# Patient Record
Sex: FEMALE | Race: WHITE | Employment: OTHER | URBAN - METROPOLITAN AREA
[De-identification: names, ages, dates, MRNs, and addresses within clinical notes are randomized per-mention and may not be internally consistent; named-entity substitution may affect disease eponyms.]

---

## 2018-10-05 PROBLEM — I27.20 PULMONARY HYPERTENSION (HCC): Status: ACTIVE | Noted: 2018-10-05

## 2018-10-05 PROBLEM — I71.40 ABDOMINAL AORTIC ANEURYSM (AAA) WITHOUT RUPTURE (HCC): Status: ACTIVE | Noted: 2018-10-05

## 2018-10-05 PROBLEM — J44.9 CHRONIC OBSTRUCTIVE PULMONARY DISEASE, UNSPECIFIED COPD TYPE (HCC): Status: ACTIVE | Noted: 2018-10-05

## 2018-10-05 PROBLEM — R60.0 LOCALIZED EDEMA: Status: ACTIVE | Noted: 2018-10-05

## 2018-10-05 PROBLEM — I71.40 ABDOMINAL AORTIC ANEURYSM (AAA) WITHOUT RUPTURE: Status: ACTIVE | Noted: 2018-10-05

## 2018-10-05 PROBLEM — R06.09 DOE (DYSPNEA ON EXERTION): Status: ACTIVE | Noted: 2018-10-05

## 2019-01-18 PROBLEM — R06.09 DOE (DYSPNEA ON EXERTION): Status: RESOLVED | Noted: 2018-10-05 | Resolved: 2019-01-18

## 2019-04-18 PROBLEM — I50.32 CHRONIC DIASTOLIC CONGESTIVE HEART FAILURE (HCC): Status: ACTIVE | Noted: 2019-04-18

## 2019-08-07 PROBLEM — R13.10 DYSPHAGIA, UNSPECIFIED TYPE: Status: ACTIVE | Noted: 2019-08-07

## 2020-03-13 PROBLEM — I50.32 CHRONIC DIASTOLIC (CONGESTIVE) HEART FAILURE (HCC): Status: ACTIVE | Noted: 2019-04-18

## 2020-03-13 PROBLEM — R13.10 DYSPHAGIA, UNSPECIFIED TYPE: Status: RESOLVED | Noted: 2019-08-07 | Resolved: 2020-03-13

## 2021-03-17 PROBLEM — Z12.11 COLON CANCER SCREENING: Status: ACTIVE | Noted: 2021-03-17

## 2021-03-17 PROBLEM — Z00.00 HEALTH CARE MAINTENANCE: Status: ACTIVE | Noted: 2021-03-17

## 2021-03-17 PROBLEM — Z12.31 VISIT FOR SCREENING MAMMOGRAM: Status: ACTIVE | Noted: 2021-03-17

## 2021-03-17 PROBLEM — Z01.419 WELL WOMAN EXAM WITH ROUTINE GYNECOLOGICAL EXAM: Status: ACTIVE | Noted: 2021-03-17

## 2021-03-17 PROBLEM — Z78.0 POST-MENOPAUSAL: Status: ACTIVE | Noted: 2021-03-17

## 2021-04-14 PROBLEM — R73.01 IMPAIRED FASTING GLUCOSE: Status: ACTIVE | Noted: 2021-04-14

## 2021-04-14 PROBLEM — Z00.00 MEDICARE ANNUAL WELLNESS VISIT, SUBSEQUENT: Status: ACTIVE | Noted: 2021-03-17

## 2021-04-14 PROBLEM — F34.1 DYSTHYMIA: Status: ACTIVE | Noted: 2021-04-14

## 2021-08-20 PROBLEM — M51.36 ANNULAR TEAR OF LUMBAR DISC: Status: ACTIVE | Noted: 2021-08-20

## 2021-08-20 PROBLEM — M47.816 LUMBAR SPONDYLOSIS: Status: ACTIVE | Noted: 2021-08-20

## 2021-08-20 PROBLEM — M51.369 ANNULAR TEAR OF LUMBAR DISC: Status: ACTIVE | Noted: 2021-08-20

## 2021-08-23 PROBLEM — B37.0 ORAL CANDIDIASIS: Status: ACTIVE | Noted: 2021-08-23

## 2021-11-30 PROBLEM — R51.9 SINUS HEADACHE: Status: ACTIVE | Noted: 2021-11-30

## 2021-11-30 PROBLEM — U07.1 COVID-19 VIRUS INFECTION: Status: ACTIVE | Noted: 2021-11-30

## 2021-12-22 PROBLEM — B37.0 ORAL CANDIDIASIS: Status: RESOLVED | Noted: 2021-08-23 | Resolved: 2021-12-22

## 2021-12-22 PROBLEM — U07.1 COVID-19 VIRUS INFECTION: Status: RESOLVED | Noted: 2021-11-30 | Resolved: 2021-12-22

## 2021-12-22 PROBLEM — J01.00 ACUTE MAXILLARY SINUSITIS, RECURRENCE NOT SPECIFIED: Status: ACTIVE | Noted: 2021-12-22

## 2022-01-18 PROBLEM — R09.02 HYPOXIA: Status: ACTIVE | Noted: 2022-01-18

## 2022-01-18 PROBLEM — R06.02 SOB (SHORTNESS OF BREATH): Status: ACTIVE | Noted: 2018-10-05

## 2022-01-18 PROBLEM — R03.0 ELEVATED BLOOD PRESSURE READING WITHOUT DIAGNOSIS OF HYPERTENSION: Status: ACTIVE | Noted: 2022-01-18

## 2022-03-17 PROBLEM — I83.018 VENOUS STASIS ULCER OF OTHER PART OF RIGHT LOWER LEG LIMITED TO BREAKDOWN OF SKIN WITH VARICOSE VEINS (HCC): Status: ACTIVE | Noted: 2022-03-17

## 2022-03-17 PROBLEM — M25.551 RIGHT HIP PAIN: Status: ACTIVE | Noted: 2022-03-17

## 2022-03-17 PROBLEM — L97.811 VENOUS STASIS ULCER OF OTHER PART OF RIGHT LOWER LEG LIMITED TO BREAKDOWN OF SKIN WITH VARICOSE VEINS (HCC): Status: ACTIVE | Noted: 2022-03-17

## 2023-10-04 ENCOUNTER — APPOINTMENT (OUTPATIENT)
Dept: GENERAL RADIOLOGY | Age: 83
End: 2023-10-04
Attending: EMERGENCY MEDICINE
Payer: MEDICARE

## 2023-10-04 ENCOUNTER — APPOINTMENT (OUTPATIENT)
Dept: CT IMAGING | Age: 83
End: 2023-10-04
Attending: EMERGENCY MEDICINE
Payer: MEDICARE

## 2023-10-04 ENCOUNTER — APPOINTMENT (OUTPATIENT)
Dept: GENERAL RADIOLOGY | Age: 83
End: 2023-10-04
Payer: MEDICARE

## 2023-10-04 ENCOUNTER — HOSPITAL ENCOUNTER (OUTPATIENT)
Facility: HOSPITAL | Age: 83
Setting detail: OBSERVATION
LOS: 1 days | Discharge: HOME | End: 2023-10-05
Attending: EMERGENCY MEDICINE | Admitting: STUDENT IN AN ORGANIZED HEALTH CARE EDUCATION/TRAINING PROGRAM
Payer: MEDICARE

## 2023-10-04 DIAGNOSIS — J18.9 PNEUMONIA OF BOTH LOWER LOBES DUE TO INFECTIOUS ORGANISM: ICD-10-CM

## 2023-10-04 DIAGNOSIS — M25.511 ACUTE PAIN OF RIGHT SHOULDER: ICD-10-CM

## 2023-10-04 DIAGNOSIS — R06.00 DYSPNEA, UNSPECIFIED TYPE: Primary | ICD-10-CM

## 2023-10-04 LAB
ALBUMIN SERPL-MCNC: 3.6 G/DL (ref 3.4–5)
ALBUMIN/GLOB SERPL: 1.1 {RATIO} (ref 1–2)
ALP LIVER SERPL-CCNC: 96 U/L
ALT SERPL-CCNC: 21 U/L
ANION GAP SERPL CALC-SCNC: 3 MMOL/L (ref 0–18)
APTT PPP: 35.9 SECONDS (ref 23.3–35.6)
AST SERPL-CCNC: 13 U/L (ref 15–37)
ATRIAL RATE: 74 BPM
BASOPHILS # BLD AUTO: 0.06 X10(3) UL (ref 0–0.2)
BASOPHILS NFR BLD AUTO: 0.7 %
BILIRUB SERPL-MCNC: 0.6 MG/DL (ref 0.1–2)
BUN BLD-MCNC: 17 MG/DL (ref 7–18)
CALCIUM BLD-MCNC: 8.4 MG/DL (ref 8.5–10.1)
CHLORIDE SERPL-SCNC: 108 MMOL/L (ref 98–112)
CO2 SERPL-SCNC: 27 MMOL/L (ref 21–32)
CREAT BLD-MCNC: 0.82 MG/DL
D DIMER PPP FEU-MCNC: 1.59 UG/ML FEU (ref ?–0.81)
EGFRCR SERPLBLD CKD-EPI 2021: 72 ML/MIN/1.73M2 (ref 60–?)
EOSINOPHIL # BLD AUTO: 0.22 X10(3) UL (ref 0–0.7)
EOSINOPHIL NFR BLD AUTO: 2.7 %
ERYTHROCYTE [DISTWIDTH] IN BLOOD BY AUTOMATED COUNT: 13.9 %
GLOBULIN PLAS-MCNC: 3.2 G/DL (ref 2.8–4.4)
GLUCOSE BLD-MCNC: 100 MG/DL (ref 70–99)
HCT VFR BLD AUTO: 36 %
HGB BLD-MCNC: 11.2 G/DL
IMM GRANULOCYTES # BLD AUTO: 0.02 X10(3) UL (ref 0–1)
IMM GRANULOCYTES NFR BLD: 0.2 %
INR BLD: 1.04 (ref 0.85–1.16)
LYMPHOCYTES # BLD AUTO: 1.63 X10(3) UL (ref 1–4)
LYMPHOCYTES NFR BLD AUTO: 19.7 %
MCH RBC QN AUTO: 27 PG (ref 26–34)
MCHC RBC AUTO-ENTMCNC: 31.1 G/DL (ref 31–37)
MCV RBC AUTO: 86.7 FL
MONOCYTES # BLD AUTO: 0.73 X10(3) UL (ref 0.1–1)
MONOCYTES NFR BLD AUTO: 8.8 %
NEUTROPHILS # BLD AUTO: 5.61 X10 (3) UL (ref 1.5–7.7)
NEUTROPHILS # BLD AUTO: 5.61 X10(3) UL (ref 1.5–7.7)
NEUTROPHILS NFR BLD AUTO: 67.9 %
NT-PROBNP SERPL-MCNC: 388 PG/ML (ref ?–450)
OSMOLALITY SERPL CALC.SUM OF ELEC: 288 MOSM/KG (ref 275–295)
P AXIS: 32 DEGREES
P-R INTERVAL: 144 MS
PLATELET # BLD AUTO: 192 10(3)UL (ref 150–450)
POTASSIUM SERPL-SCNC: 4 MMOL/L (ref 3.5–5.1)
PROCALCITONIN SERPL-MCNC: <0.05 NG/ML (ref ?–0.16)
PROT SERPL-MCNC: 6.8 G/DL (ref 6.4–8.2)
PROTHROMBIN TIME: 13.6 SECONDS (ref 11.6–14.8)
Q-T INTERVAL: 398 MS
QRS DURATION: 76 MS
QTC CALCULATION (BEZET): 441 MS
R AXIS: 48 DEGREES
RBC # BLD AUTO: 4.15 X10(6)UL
SARS-COV-2 RNA RESP QL NAA+PROBE: NOT DETECTED
SODIUM SERPL-SCNC: 138 MMOL/L (ref 136–145)
T AXIS: 41 DEGREES
TROPONIN I HIGH SENSITIVITY: 5 NG/L
VENTRICULAR RATE: 74 BPM
WBC # BLD AUTO: 8.3 X10(3) UL (ref 4–11)

## 2023-10-04 PROCEDURE — 99223 1ST HOSP IP/OBS HIGH 75: CPT | Performed by: STUDENT IN AN ORGANIZED HEALTH CARE EDUCATION/TRAINING PROGRAM

## 2023-10-04 PROCEDURE — 71260 CT THORAX DX C+: CPT | Performed by: EMERGENCY MEDICINE

## 2023-10-04 PROCEDURE — 71045 X-RAY EXAM CHEST 1 VIEW: CPT | Performed by: EMERGENCY MEDICINE

## 2023-10-04 PROCEDURE — 73030 X-RAY EXAM OF SHOULDER: CPT | Performed by: EMERGENCY MEDICINE

## 2023-10-04 PROCEDURE — 73030 X-RAY EXAM OF SHOULDER: CPT

## 2023-10-04 RX ORDER — ENEMA 19; 7 G/133ML; G/133ML
1 ENEMA RECTAL ONCE AS NEEDED
Status: DISCONTINUED | OUTPATIENT
Start: 2023-10-04 | End: 2023-10-05

## 2023-10-04 RX ORDER — AZITHROMYCIN 250 MG/1
500 TABLET, FILM COATED ORAL
Status: DISCONTINUED | OUTPATIENT
Start: 2023-10-05 | End: 2023-10-05

## 2023-10-04 RX ORDER — MAGNESIUM OXIDE 400 MG (241.3 MG MAGNESIUM) TABLET
3 TABLET NIGHTLY PRN
Status: DISCONTINUED | OUTPATIENT
Start: 2023-10-04 | End: 2023-10-05

## 2023-10-04 RX ORDER — ONDANSETRON 2 MG/ML
4 INJECTION INTRAMUSCULAR; INTRAVENOUS EVERY 6 HOURS PRN
Status: DISCONTINUED | OUTPATIENT
Start: 2023-10-04 | End: 2023-10-05

## 2023-10-04 RX ORDER — ACETAMINOPHEN 500 MG
500 TABLET ORAL EVERY 4 HOURS PRN
Status: DISCONTINUED | OUTPATIENT
Start: 2023-10-04 | End: 2023-10-05

## 2023-10-04 RX ORDER — METOCLOPRAMIDE HYDROCHLORIDE 5 MG/ML
10 INJECTION INTRAMUSCULAR; INTRAVENOUS EVERY 8 HOURS PRN
Status: DISCONTINUED | OUTPATIENT
Start: 2023-10-04 | End: 2023-10-05

## 2023-10-04 RX ORDER — HYDROCODONE BITARTRATE AND ACETAMINOPHEN 5; 325 MG/1; MG/1
1 TABLET ORAL EVERY 6 HOURS PRN
Status: DISCONTINUED | OUTPATIENT
Start: 2023-10-04 | End: 2023-10-05

## 2023-10-04 RX ORDER — HYDROCODONE BITARTRATE AND ACETAMINOPHEN 5; 325 MG/1; MG/1
1 TABLET ORAL ONCE
Status: COMPLETED | OUTPATIENT
Start: 2023-10-04 | End: 2023-10-04

## 2023-10-04 RX ORDER — ENOXAPARIN SODIUM 100 MG/ML
40 INJECTION SUBCUTANEOUS DAILY
Status: DISCONTINUED | OUTPATIENT
Start: 2023-10-05 | End: 2023-10-05

## 2023-10-04 RX ORDER — ONDANSETRON 2 MG/ML
4 INJECTION INTRAMUSCULAR; INTRAVENOUS EVERY 4 HOURS PRN
Status: ACTIVE | OUTPATIENT
Start: 2023-10-04 | End: 2023-10-04

## 2023-10-04 RX ORDER — BISACODYL 10 MG
10 SUPPOSITORY, RECTAL RECTAL
Status: DISCONTINUED | OUTPATIENT
Start: 2023-10-04 | End: 2023-10-05

## 2023-10-04 RX ORDER — SENNOSIDES 8.6 MG
17.2 TABLET ORAL NIGHTLY PRN
Status: DISCONTINUED | OUTPATIENT
Start: 2023-10-04 | End: 2023-10-05

## 2023-10-04 RX ORDER — BENZONATATE 100 MG/1
200 CAPSULE ORAL 3 TIMES DAILY PRN
Status: DISCONTINUED | OUTPATIENT
Start: 2023-10-04 | End: 2023-10-05

## 2023-10-04 RX ORDER — FLUOXETINE HYDROCHLORIDE 20 MG/1
40 CAPSULE ORAL DAILY
Status: DISCONTINUED | OUTPATIENT
Start: 2023-10-04 | End: 2023-10-05

## 2023-10-04 RX ORDER — GUAIFENESIN 600 MG/1
600 TABLET, EXTENDED RELEASE ORAL 2 TIMES DAILY PRN
Status: DISCONTINUED | OUTPATIENT
Start: 2023-10-04 | End: 2023-10-05

## 2023-10-04 RX ORDER — FLUTICASONE FUROATE AND VILANTEROL 100; 25 UG/1; UG/1
1 POWDER RESPIRATORY (INHALATION) DAILY
Status: DISCONTINUED | OUTPATIENT
Start: 2023-10-05 | End: 2023-10-05

## 2023-10-04 RX ORDER — POLYETHYLENE GLYCOL 3350 17 G/17G
17 POWDER, FOR SOLUTION ORAL DAILY PRN
Status: DISCONTINUED | OUTPATIENT
Start: 2023-10-04 | End: 2023-10-05

## 2023-10-04 RX ORDER — MONTELUKAST SODIUM 10 MG/1
10 TABLET ORAL NIGHTLY
Status: DISCONTINUED | OUTPATIENT
Start: 2023-10-04 | End: 2023-10-05

## 2023-10-04 RX ORDER — ECHINACEA PURPUREA EXTRACT 125 MG
1 TABLET ORAL
Status: DISCONTINUED | OUTPATIENT
Start: 2023-10-04 | End: 2023-10-05

## 2023-10-04 NOTE — ED INITIAL ASSESSMENT (HPI)
States she fell in her bathroom yesterday and injured her right shoulder. Also states has felt SOB for the past month. No worse today. Pt on 6L home O2.

## 2023-10-05 VITALS
WEIGHT: 125.25 LBS | SYSTOLIC BLOOD PRESSURE: 139 MMHG | DIASTOLIC BLOOD PRESSURE: 61 MMHG | HEIGHT: 64.5 IN | OXYGEN SATURATION: 93 % | TEMPERATURE: 97 F | BODY MASS INDEX: 21.12 KG/M2 | RESPIRATION RATE: 17 BRPM | HEART RATE: 74 BPM

## 2023-10-05 LAB
ALBUMIN SERPL-MCNC: 3.2 G/DL (ref 3.4–5)
ALBUMIN/GLOB SERPL: 0.9 {RATIO} (ref 1–2)
ALP LIVER SERPL-CCNC: 90 U/L
ALT SERPL-CCNC: 20 U/L
ANION GAP SERPL CALC-SCNC: 5 MMOL/L (ref 0–18)
AST SERPL-CCNC: 11 U/L (ref 15–37)
BASOPHILS # BLD AUTO: 0.06 X10(3) UL (ref 0–0.2)
BASOPHILS NFR BLD AUTO: 0.9 %
BILIRUB SERPL-MCNC: 0.6 MG/DL (ref 0.1–2)
BUN BLD-MCNC: 12 MG/DL (ref 7–18)
CALCIUM BLD-MCNC: 8.5 MG/DL (ref 8.5–10.1)
CHLORIDE SERPL-SCNC: 110 MMOL/L (ref 98–112)
CO2 SERPL-SCNC: 25 MMOL/L (ref 21–32)
CREAT BLD-MCNC: 0.63 MG/DL
EGFRCR SERPLBLD CKD-EPI 2021: 89 ML/MIN/1.73M2 (ref 60–?)
EOSINOPHIL # BLD AUTO: 0.25 X10(3) UL (ref 0–0.7)
EOSINOPHIL NFR BLD AUTO: 3.7 %
ERYTHROCYTE [DISTWIDTH] IN BLOOD BY AUTOMATED COUNT: 13.6 %
GLOBULIN PLAS-MCNC: 3.4 G/DL (ref 2.8–4.4)
GLUCOSE BLD-MCNC: 97 MG/DL (ref 70–99)
HCT VFR BLD AUTO: 35.3 %
HGB BLD-MCNC: 11.4 G/DL
IMM GRANULOCYTES # BLD AUTO: 0.02 X10(3) UL (ref 0–1)
IMM GRANULOCYTES NFR BLD: 0.3 %
L PNEUMO AG UR QL: NEGATIVE
LYMPHOCYTES # BLD AUTO: 0.99 X10(3) UL (ref 1–4)
LYMPHOCYTES NFR BLD AUTO: 14.5 %
MCH RBC QN AUTO: 27 PG (ref 26–34)
MCHC RBC AUTO-ENTMCNC: 32.3 G/DL (ref 31–37)
MCV RBC AUTO: 83.6 FL
MONOCYTES # BLD AUTO: 0.65 X10(3) UL (ref 0.1–1)
MONOCYTES NFR BLD AUTO: 9.5 %
NEUTROPHILS # BLD AUTO: 4.85 X10 (3) UL (ref 1.5–7.7)
NEUTROPHILS # BLD AUTO: 4.85 X10(3) UL (ref 1.5–7.7)
NEUTROPHILS NFR BLD AUTO: 71.1 %
OSMOLALITY SERPL CALC.SUM OF ELEC: 290 MOSM/KG (ref 275–295)
PLATELET # BLD AUTO: 187 10(3)UL (ref 150–450)
POTASSIUM SERPL-SCNC: 3.9 MMOL/L (ref 3.5–5.1)
PROT SERPL-MCNC: 6.6 G/DL (ref 6.4–8.2)
RBC # BLD AUTO: 4.22 X10(6)UL
SODIUM SERPL-SCNC: 140 MMOL/L (ref 136–145)
STREP PNEUMO ANTIGEN, URINE: NEGATIVE
WBC # BLD AUTO: 6.8 X10(3) UL (ref 4–11)

## 2023-10-05 PROCEDURE — 99239 HOSP IP/OBS DSCHRG MGMT >30: CPT | Performed by: HOSPITALIST

## 2023-10-05 NOTE — ED QUICK NOTES
Orders for admission, patient is aware of plan and ready to go upstairs. Any questions, please call ED RN Corina Biswas  at extension [phone]. Vaccinated? yes  Type of COVID test sent:rapid  COVID Suspicion level: Low      Titratable drug(s) infusing:  Rate: Zithromax 500mg at 250ml /hr    LOC at time of transport:    Other pertinent information: Pt on home O2 at 6L . States she has felt exertional SOB for a month but worse lately with fatigue.  SaO2 on arrival 78% but immediately karen to 97% with 6L    CIWA score=  NIH score=

## 2023-10-05 NOTE — PLAN OF CARE
NURSING ADMISSION NOTE      Patient admitted via Ambulance  Oriented to room 515. Safety precautions initiated. Bed in low position. Call light in reach. Pt A&OX4. Nvigator and PTA med list completed - MD notified. Received on 6L (Baseline) satikiya WNL. . On tele. NSR. C/o R shoulder pain - See MAR. IV ABX. PIV SL. Continent. BRP. Up standby. HFR. Pt updated on plan of care and verbalized understanding. Call light within reach. All needs met at this time. Safety precautions in place. Will follow.

## 2023-10-05 NOTE — PROGRESS NOTES
NURSING DISCHARGE NOTE    Discharged Home via Wheelchair. Accompanied by Family member and Support staff  Belongings Taken by patient/family. AVS explained. Answered all questions/concerns. VSS. IV removed. Patient taken home with 6L O2 (home O2). No further needs at this time.

## 2023-10-05 NOTE — PLAN OF CARE
Patient is a/o x4. On 6L of O2. NSR on tele. Lovenox. Regular diet. Continent of bowel and bladder. Up SBA. Reports pain of shoulder- see MAR. Safety precautions in place. Call light within reach. No further needs at this time. Problem: Patient/Family Goals  Goal: Patient/Family Long Term Goal  Description: Patient's Long Term Goal: To discharge home with adequate resources    Interventions:  - Comply with plan of care  - See additional Care Plan goals for specific interventions  Outcome: Progressing  Goal: Patient/Family Short Term Goal  Description: Patient's Short Term Goal:   10/4 NOC: sleep, remain free from injury    Interventions:   - Cluster care, dim lights, bed alarm, call light within reach   - See additional Care Plan goals for specific interventions  Outcome: Progressing     Problem: SAFETY ADULT - FALL  Goal: Free from fall injury  Description: INTERVENTIONS:  - Assess pt frequently for physical needs  - Identify cognitive and physical deficits and behaviors that affect risk of falls.   - [place] fall precautions as indicated by assessment.  - Educate pt/family on patient safety including physical limitations  - Instruct pt to call for assistance with activity based on assessment  - Modify environment to reduce risk of injury  - Provide assistive devices as appropriate  - Consider OT/PT consult to assist with strengthening/mobility  - Encourage toileting schedule  Outcome: Progressing     Problem: PAIN - ADULT  Goal: Verbalizes/displays adequate comfort level or patient's stated pain goal  Description: INTERVENTIONS:  - Encourage pt to monitor pain and request assistance  - Assess pain using appropriate pain scale  - Administer analgesics based on type and severity of pain and evaluate response  - Implement non-pharmacological measures as appropriate and evaluate response  - Consider cultural and social influences on pain and pain management  - Manage/alleviate anxiety  - Utilize distraction and/or relaxation techniques  - Monitor for opioid side effects  - Notify MD/LIP if interventions unsuccessful or patient reports new pain  - Anticipate increased pain with activity and pre-medicate as appropriate  Outcome: Progressing

## 2023-10-05 NOTE — PLAN OF CARE
Problem: Patient/Family Goals  Goal: Patient/Family Long Term Goal  Description: Patient's Long Term Goal: To discharge home with adequate resources    Interventions:  - Comply with plan of care  - See additional Care Plan goals for specific interventions  Outcome: Progressing  Goal: Patient/Family Short Term Goal  Description: Patient's Short Term Goal:   10/4 NOC: sleep, remain free from injury    Interventions:   - Cluster care, dim lights, bed alarm, call light within reach   - See additional Care Plan goals for specific interventions  Outcome: Progressing     Problem: SAFETY ADULT - FALL  Goal: Free from fall injury  Description: INTERVENTIONS:  - Assess pt frequently for physical needs  - Identify cognitive and physical deficits and behaviors that affect risk of falls.   - [place] fall precautions as indicated by assessment.  - Educate pt/family on patient safety including physical limitations  - Instruct pt to call for assistance with activity based on assessment  - Modify environment to reduce risk of injury  - Provide assistive devices as appropriate  - Consider OT/PT consult to assist with strengthening/mobility  - Encourage toileting schedule  Outcome: Progressing     Problem: PAIN - ADULT  Goal: Verbalizes/displays adequate comfort level or patient's stated pain goal  Description: INTERVENTIONS:  - Encourage pt to monitor pain and request assistance  - Assess pain using appropriate pain scale  - Administer analgesics based on type and severity of pain and evaluate response  - Implement non-pharmacological measures as appropriate and evaluate response  - Consider cultural and social influences on pain and pain management  - Manage/alleviate anxiety  - Utilize distraction and/or relaxation techniques  - Monitor for opioid side effects  - Notify MD/LIP if interventions unsuccessful or patient reports new pain  - Anticipate increased pain with activity and pre-medicate as appropriate  Outcome: Progressing

## 2024-08-08 ENCOUNTER — APPOINTMENT (OUTPATIENT)
Dept: CT IMAGING | Age: 84
End: 2024-08-08
Attending: PHYSICIAN ASSISTANT
Payer: MEDICARE

## 2024-08-08 ENCOUNTER — HOSPITAL ENCOUNTER (EMERGENCY)
Age: 84
Discharge: HOME | End: 2024-08-08
Attending: EMERGENCY MEDICINE
Payer: MEDICARE

## 2024-08-08 VITALS
RESPIRATION RATE: 16 BRPM | TEMPERATURE: 98 F | DIASTOLIC BLOOD PRESSURE: 68 MMHG | WEIGHT: 123 LBS | OXYGEN SATURATION: 97 % | BODY MASS INDEX: 20.74 KG/M2 | HEART RATE: 69 BPM | HEIGHT: 64.5 IN | SYSTOLIC BLOOD PRESSURE: 156 MMHG

## 2024-08-08 DIAGNOSIS — A08.4 VIRAL ENTERITIS: Primary | ICD-10-CM

## 2024-08-08 DIAGNOSIS — R82.90 MALODOROUS URINE: ICD-10-CM

## 2024-08-08 DIAGNOSIS — K59.00 CONSTIPATION, UNSPECIFIED CONSTIPATION TYPE: ICD-10-CM

## 2024-08-08 LAB
ALBUMIN SERPL-MCNC: 3.8 G/DL (ref 3.4–5)
ALBUMIN/GLOB SERPL: 1.3 {RATIO} (ref 1–2)
ALP LIVER SERPL-CCNC: 97 U/L
ALT SERPL-CCNC: 20 U/L
ANION GAP SERPL CALC-SCNC: 4 MMOL/L (ref 0–18)
AST SERPL-CCNC: 13 U/L (ref 15–37)
BASOPHILS # BLD AUTO: 0.03 X10(3) UL (ref 0–0.2)
BASOPHILS NFR BLD AUTO: 0.5 %
BILIRUB SERPL-MCNC: 0.4 MG/DL (ref 0.1–2)
BILIRUB UR QL STRIP.AUTO: NEGATIVE
BUN BLD-MCNC: 18 MG/DL (ref 9–23)
CALCIUM BLD-MCNC: 9.1 MG/DL (ref 8.5–10.1)
CHLORIDE SERPL-SCNC: 105 MMOL/L (ref 98–112)
CLARITY UR REFRACT.AUTO: CLEAR
CO2 SERPL-SCNC: 30 MMOL/L (ref 21–32)
COLOR UR AUTO: YELLOW
CREAT BLD-MCNC: 0.69 MG/DL
EGFRCR SERPLBLD CKD-EPI 2021: 87 ML/MIN/1.73M2 (ref 60–?)
EOSINOPHIL # BLD AUTO: 0.13 X10(3) UL (ref 0–0.7)
EOSINOPHIL NFR BLD AUTO: 2.1 %
ERYTHROCYTE [DISTWIDTH] IN BLOOD BY AUTOMATED COUNT: 13.6 %
GLOBULIN PLAS-MCNC: 3 G/DL (ref 2.8–4.4)
GLUCOSE BLD-MCNC: 98 MG/DL (ref 70–99)
GLUCOSE UR STRIP.AUTO-MCNC: NEGATIVE MG/DL
HCT VFR BLD AUTO: 35.4 %
HGB BLD-MCNC: 11.1 G/DL
IMM GRANULOCYTES # BLD AUTO: 0.02 X10(3) UL (ref 0–1)
IMM GRANULOCYTES NFR BLD: 0.3 %
KETONES UR STRIP.AUTO-MCNC: NEGATIVE MG/DL
LEUKOCYTE ESTERASE UR QL STRIP.AUTO: NEGATIVE
LYMPHOCYTES # BLD AUTO: 1.19 X10(3) UL (ref 1–4)
LYMPHOCYTES NFR BLD AUTO: 19.3 %
MCH RBC QN AUTO: 27.5 PG (ref 26–34)
MCHC RBC AUTO-ENTMCNC: 31.4 G/DL (ref 31–37)
MCV RBC AUTO: 87.6 FL
MONOCYTES # BLD AUTO: 0.48 X10(3) UL (ref 0.1–1)
MONOCYTES NFR BLD AUTO: 7.8 %
NEUTROPHILS # BLD AUTO: 4.32 X10 (3) UL (ref 1.5–7.7)
NEUTROPHILS # BLD AUTO: 4.32 X10(3) UL (ref 1.5–7.7)
NEUTROPHILS NFR BLD AUTO: 70 %
NITRITE UR QL STRIP.AUTO: POSITIVE
OSMOLALITY SERPL CALC.SUM OF ELEC: 290 MOSM/KG (ref 275–295)
PH UR STRIP.AUTO: 7 [PH] (ref 5–8)
PLATELET # BLD AUTO: 200 10(3)UL (ref 150–450)
POTASSIUM SERPL-SCNC: 3.9 MMOL/L (ref 3.5–5.1)
PROT SERPL-MCNC: 6.8 G/DL (ref 6.4–8.2)
PROT UR STRIP.AUTO-MCNC: NEGATIVE MG/DL
RBC # BLD AUTO: 4.04 X10(6)UL
RBC UR QL AUTO: NEGATIVE
SODIUM SERPL-SCNC: 139 MMOL/L (ref 136–145)
SP GR UR STRIP.AUTO: 1.02 (ref 1–1.03)
UROBILINOGEN UR STRIP.AUTO-MCNC: 0.2 MG/DL
WBC # BLD AUTO: 6.2 X10(3) UL (ref 4–11)

## 2024-08-08 PROCEDURE — 81001 URINALYSIS AUTO W/SCOPE: CPT | Performed by: PHYSICIAN ASSISTANT

## 2024-08-08 PROCEDURE — 85025 COMPLETE CBC W/AUTO DIFF WBC: CPT | Performed by: PHYSICIAN ASSISTANT

## 2024-08-08 PROCEDURE — 74177 CT ABD & PELVIS W/CONTRAST: CPT | Performed by: PHYSICIAN ASSISTANT

## 2024-08-08 PROCEDURE — 99285 EMERGENCY DEPT VISIT HI MDM: CPT

## 2024-08-08 PROCEDURE — 81015 MICROSCOPIC EXAM OF URINE: CPT | Performed by: PHYSICIAN ASSISTANT

## 2024-08-08 PROCEDURE — 96361 HYDRATE IV INFUSION ADD-ON: CPT

## 2024-08-08 PROCEDURE — 99284 EMERGENCY DEPT VISIT MOD MDM: CPT

## 2024-08-08 PROCEDURE — 80053 COMPREHEN METABOLIC PANEL: CPT | Performed by: PHYSICIAN ASSISTANT

## 2024-08-08 PROCEDURE — 96374 THER/PROPH/DIAG INJ IV PUSH: CPT

## 2024-08-08 RX ORDER — NITROFURANTOIN 25; 75 MG/1; MG/1
100 CAPSULE ORAL 2 TIMES DAILY
Qty: 10 CAPSULE | Refills: 0 | Status: SHIPPED | OUTPATIENT
Start: 2024-08-08 | End: 2024-08-13

## 2024-08-08 RX ORDER — MORPHINE SULFATE 4 MG/ML
2 INJECTION, SOLUTION INTRAMUSCULAR; INTRAVENOUS ONCE
Status: COMPLETED | OUTPATIENT
Start: 2024-08-08 | End: 2024-08-08

## 2024-08-08 NOTE — DISCHARGE INSTRUCTIONS
Antibiotic as written.  Continue MiraLAX and Dulcolax.  Add magnesium citrate if needed.  If you develop any severe pain, fever, blood or mucus in stool immediately return to the ER.  If urine culture dictates a change in therapy, you will be contacted

## 2024-08-08 NOTE — ED PROVIDER NOTES
Patient with several medical issues including pulmonary hypertension, coronary artery disease, hyperlipidemia, COPD on chronic oxygen and 2 abdominal hernia surgeries..  Patient has had several surgeries in the past.  Patient suspects that she has a hernia in the right groin as she has had pain in that location for some time.  For the last week or so, patient reports increased pain at the hernia site.  Patient is passing gas and having small stools.  For the last month, she describes mostly small favian  No urinary symptoms or flank pain.  No upper abdominal pain   Patient not on any fiber supplement or stool softeners    on examination, this alert elderly woman who appears uncomfortable  Eyes sclera white abdomen with large well-healed scar, nondistended mildly diffusely tender across lower quadrants bilaterally.  There is some fullness in the right groin.   Calves are nonswollen and symmetric    Patient with pain in the right groin suggests hernia.  Abdominal examination is nonsurgical but she is quite tender.  Symptoms are not typical of renal colic although this can be considered with groin pain.  Patient treated with IV fluid and offered pain and nausea medicine    CBC shows normal white count, hematocrit, platelets   metabolic panel unremarkable  Urinalysis shows positive nitrites but only 1-5 WBCs in a specimen contaminated with epithelial cells  Urine culture sent    CT abdomen pelvis  I personally reviewed the actual radiographs themselves and my individual interpretation shows no hernia.  There is aortic aneurysm requiring follow-up  radiologist's formal interpretation which I have reviewed  CONCLUSION:  Suspect enteritis.  No sign of bowel obstruction.  No ascites or free air.  Sizable abdominal aortic aneurysm measuring maximum diameter 4.7 cm transverse dimension.  Details above.  Appropriate follow-up and management for this aneurysm is   advised.  No sign of dissection or acute rupture of the aorta.        Test were reviewed with the patient.  I would recommend follow-up with her primary care doctor and/or GI specialist, rest, light diet, Tylenol or Motrin for pain.  I also discussed starting fiber supplement and stool softener with the patient and her friend to maintain regularity with bulky soft stools    I provided a substantive portion of care for this patient. I personally performed the medical decision making for this encounter.

## 2024-08-08 NOTE — ED PROVIDER NOTES
Patient Seen in: [facility] [place]      History     Chief Complaint   Patient presents with    Abdomen/Flank Pain    Hernia     Stated Complaint: abd pain, hernia ain for one week worse    Subjective:   HPI    83-year-old female.  Medical history of COPD on chronic oxygen.   coronary artery disease, hyperlipidemia, pulmonary hypertension.  Patient also explains that she is under gone 5 previous abdominal surgeries including 3 hernia repairs.  She has a known hernia to the right inguinal space.  For the past month, her stools have been abnormal.  Described as small favian.  These past 7 to 10 days she has had increased pain to her inguinal site with possible increased protrusion of her known hernia.  No fever or chills.  No urinary complaints.  No vomiting.  Continues to pass gas and have small stools.    Objective:   Past Medical History:    ANXIETY    Cervical radiculitis    COPD (chronic obstructive pulmonary disease) (HCC)    Coronary atherosclerosis of unspecified type of vessel, native or graft    H/O degenerative disc disease    HYPERLIPIDEMIA    Irritable bowel syndrome    Lumbar radiculitis    Lumbar spondylosis    OSTEOARTHRITIS    OSTEOPOROSIS    Other and unspecified personal history of malignant neoplasm    Pulmonary hypertension (HCC)    Venous insufficiency              Past Surgical History:   Procedure Laterality Date    Appendectomy      Colonoscopy      Epidurography, radiological s & i  3/12/2012    Procedure: TRANSFORAMINAL EPIDURAL - LUMBAR;  Surgeon: oJe Gonsales MD;  Location: [facility]    Hernia surgery      Hysterectomy      Injection, anesthetic/steroid, transforaminal epidural; lumbar/sacral, single level  2/3/2012    Performed by MALORIE GALICIA at [facility]    Injection, anesthetic/steroid, transforaminal epidural; lumbar/sacral, single level  2/21/2012    Procedure: TRANSFORAMINAL EPIDURAL - LUMBAR;  Surgeon:  Alex Hartman MD;  Location: [facility]    Injection, w/wo contrast, dx/therapeutic substance, epidural/subarachnoid; cervical/thoracic  3/12/2012    Procedure: TRANSFORAMINAL EPIDURAL - LUMBAR;  Surgeon: Joe Gonsales MD;  Location: [facility]    Injection, w/wo contrast, dx/therapeutic substance, epidural/subarachnoid; cervical/thoracic N/A 3/22/2016    Procedure: CERVICAL EPIDURAL;  Surgeon: Joe Gonsales MD;  Location: [facility]    Ir varicose vein endovenous laser ablation(cpt=36478)  2014    left SSV    Ir varicose vein endovenous laser ablation(cpt=36478) Right 09/2020    Laminectomy      laminectomy    M-sedaj by  phys perfrmg svc 5+ yr  2/3/2012    Performed by MALORIE GALICIA at [facility]    M-sedaj by  phys perfrmg svc 5+ yr  2/21/2012    Procedure: TRANSFORAMINAL EPIDURAL - LUMBAR;  Surgeon: Alex Hartman MD;  Location: [facility]    M-sedaj by  phys perfrmg svc 5+ yr  3/12/2012    Procedure: TRANSFORAMINAL EPIDURAL - LUMBAR;  Surgeon: Joe Gonsales MD;  Location: [facility]    M-sedaj by  phys perfrmg svc 5+ yr N/A 3/22/2016    Procedure: CERVICAL EPIDURAL;  Surgeon: Joe Gonsales MD;  Location: [facility]    Other surgical history      anterior repair of pelvic floor    Patient documented not to have experienced any of the following events N/A 3/22/2016    Procedure: CERVICAL EPIDURAL;  Surgeon: Joe Gonsales MD;  Location: [facility]    Patient withough preoperative order for iv antibiotic surgical site infection prophylaxis. N/A 3/22/2016    Procedure: CERVICAL EPIDURAL;  Surgeon: Joe Gonsales MD;  Location: [facility]    Repair ing hernia,5+y/o,reducibl      Repair shoulder capsule,anterior      Sclerotherapy Bilateral 2014    Special service or report      removal of colon mass    Spinal  fusion      cervical fusion    Tonsillectomy      Total abdom hysterectomy      Vein ligation and stripping Bilateral     about                 Social History     Socioeconomic History    Marital status:    Tobacco Use    Smoking status: Former     Current packs/day: 0.00     Types: Cigarettes     Quit date: 3/1/2005     Years since quittin.4    Smokeless tobacco: Never   Vaping Use    Vaping status: Never Used   Substance and Sexual Activity    Alcohol use: No    Drug use: No     Comment: pt did not respond     Social Determinants of Health     Food Insecurity: No Food Insecurity (10/4/2023)    Food Insecurity     Food Insecurity: Never true   Transportation Needs: No Transportation Needs (10/4/2023)    Transportation Needs     Lack of Transportation: No   Housing Stability: Low Risk  (10/4/2023)    Housing Stability     Housing Instability: No              Review of Systems    Positive for stated Chief Complaint: Abdomen/Flank Pain and Hernia    Other systems are as noted in HPI.  Constitutional and vital signs reviewed.      All other systems reviewed and negative except as noted above.    Physical Exam     ED Triage Vitals [24 1247]   /70   Pulse 70   Resp 18   Temp 97.9 °F (36.6 °C)   Temp src Temporal   SpO2 90 %   O2 Device Nasal cannula       Current Vitals:   Vital Signs  BP: 156/68  Pulse: 69  Resp: 16  Temp: 97.9 °F (36.6 °C)  Temp src: Temporal    Oxygen Therapy  SpO2: 97 %  O2 Device: Nasal cannula  O2 Flow Rate (L/min): 6 L/min            Physical Exam    Gen: Well appearing, well groomed, alert and aware x 3  Neck: Supple, full range of motion, no thyromegaly or lymphadenopathy.  Abdominal: Right inguinal palpable fullness.  Minimal pain to palpation.  Soft.  Generalized pain to patient through the lower quadrant  Back: Full active range of motion.  No CVA tenderness bilaterally.  Lung: No distress, RR, no retraction, breath sounds are clear bilaterally  Cardio: Regular  rate and rhythm, normal S1-S2, no murmur appreciable    ED Course     Labs Reviewed   CBC WITH DIFFERENTIAL WITH PLATELET - Abnormal; Notable for the following components:       Result Value    HGB 11.1 (*)     All other components within normal limits   COMP METABOLIC PANEL (14) - Abnormal; Notable for the following components:    AST 13 (*)     All other components within normal limits   URINALYSIS, ROUTINE - Abnormal; Notable for the following components:    Nitrite Urine Positive (*)     All other components within normal limits   UA MICROSCOPIC ONLY, URINE - Abnormal; Notable for the following components:    Bacteria Urine 3+ (*)     Squamous Epi. Cells Few (*)     All other components within normal limits           CT ABDOMEN+PELVIS(CONTRAST ONLY)(CPT=74177)    Result Date: 8/8/2024  PROCEDURE:  CT ABDOMEN+PELVIS (CONTRAST ONLY) (CPT=74177)  COMPARISON:  None.  INDICATIONS:  abd pain, hernia ain for one week worse  TECHNIQUE:  CT scanning was performed from the dome of the diaphragm to the pubic symphysis with non-ionic intravenous contrast material. Post contrast coronal MPR imaging was performed.  Dose reduction techniques were used. Dose information is transmitted to the ACR (American College of Radiology) NRDR (National Radiology Data Registry) which includes the Dose Index Registry. The patient is from the Emergency Department. This examination performed and interpreted on emergency basis, as per STAT status, for emergency room protocol.   PATIENT STATED HISTORY:(As transcribed by Technologist)  Patient with abdominal pain, hernia pain for one week, worsening.   CONTRAST USED:  85cc of Isovue 370  FINDINGS:    LIVER:  Unremarkable.  BILIARY:  Status post cholecystectomy.  Prominence of the biliary ductal structures, most likely secondary to a combination of reservoir effect status post cholecystectomy and the patient's age, this can be correlated as needed with biliary laboratory studies.  PANCREAS:   Unremarkable.  SPLEEN:  Unremarkable.  KIDNEYS:  No acute abnormality.  ADRENALS:  Unremarkable.  AORTA/VASCULAR:  Sizable aneurysm of the abdominal aorta 4.4 x 4.7 cm AP transverse dimensions axial image 32.  The aneurysm begins just below the origin of the renal arteries, and extends in length about 5.6 cm.  Atherosclerotic calcifications are seen.   Iliac arteries show no aneurysm.  No sign of aortic dissection or rupture, no sign of retroperitoneal bleed.  Note that these observations are made on the basis of the routine contrast CT, this does not represent a dedicated abdominal CTA.   RETROPERITONEUM:  Unremarkable.  BOWEL/MESENTERY:  No free air identified, no ascites.  Moderate stool and gas in the colon, with some partially liquefied stool in the right colon.  No rectal fecal impaction.  No sign of colitis or diverticulitis.  Moderate amount of fluid and air in the small bowel.  Findings are suspicious for enteritis without obstruction.  ABDOMINAL WALL:  Unremarkable.  URINARY BLADDER:  Unremarkable.  LYMPH NODES PELVIS:  Unremarkable.  PELVIC ORGANS:  No mass identified.  LUNG BASES:  No acute process.  Interstitial fibrotic changes at the lung base including honeycombing  BONES:  No acute abnormality. Note is made of degenerative changes present in the spine.              CONCLUSION:  Suspect enteritis.  No sign of bowel obstruction.  No ascites or free air.  Sizable abdominal aortic aneurysm measuring maximum diameter 4.7 cm transverse dimension.  Details above.  Appropriate follow-up and management for this aneurysm is advised.  No sign of dissection or acute rupture of the aorta.   LOCATION:  [id number]   Dictated by (CST): Preston Kenney MD on 8/08/2024 at 3:38 PM     Finalized by (CST): Preston Kenney MD on 8/08/2024 at 3:42 PM               MDM        This is a well-appearing cheerful 83-year-old female.  Normal vital signs.  She does have some palpable fullness of the right inguinal region.  She has a  low-grade generalized pain to palpation through the lower abdomen.  No CVA tenderness.    IV placed.  Fluid bolus.  CBC, CMP and urinalysis.    CBC demonstrates no leukocytosis.  Hemoglobin of 11.1    CMP demonstrates normal renal function.  No other abnormalities    Urinalysis demonstrates 3+ bacteria and few squamous epithelials.  Nitrite positive but no leukocytes.  No blood.  I reviewed the patient's urine sample.  She does endorse some foul-smelling urine more recently.  No dysuria or frequency.  Urine sent for culture.  Considering the malodorous urine and above results will initiate Macrobid.      CONCLUSION: Moderate amount of fluid and air in the small bowel.  Suspect enteritis.  No sign of bowel obstruction.  No ascites or free air.  Sizable abdominal aortic aneurysm measuring maximum diameter 4.7 cm transverse dimension.  Details above.  Appropriate follow-up and management for this aneurysm is advised.  No sign of dissection or acute rupture of the aorta.   LOCATION:  [id number]   Dictated by (CST): Preston Kenney MD on 8/08/2024 at 3:38 PM     Finalized by (CST): Preston Kenney MD on 8/08/2024 at 3:42 PM        CT as above.  Reviewed with patient.  She will continue her MiraLAX and Dulcolax.  She will follow-up with her previous GI specialty.  She will monitor for any blood or mucus in stools, severe pain or fever.                             Medical Decision Making      Disposition and Plan     Clinical Impression:  1. Viral enteritis    2. Constipation, unspecified constipation type    3. Malodorous urine         Disposition:  There is no disposition on file for this visit.  There is no disposition time on file for this visit.    Follow-up:  Gisele Faulkner MD  [address] [place] [id number] [phone]    Follow up            Medications Prescribed:  Current Discharge Medication List        START taking these medications    Details   nitrofurantoin monohydrate macro 100 MG Oral Cap Take 1  capsule (100 mg total) by mouth 2 (two) times daily for 5 days.  Qty: 10 capsule, Refills: 0

## 2024-12-15 ENCOUNTER — APPOINTMENT (OUTPATIENT)
Dept: GENERAL RADIOLOGY | Age: 84
End: 2024-12-15
Attending: EMERGENCY MEDICINE
Payer: MEDICARE

## 2024-12-15 ENCOUNTER — APPOINTMENT (OUTPATIENT)
Dept: CT IMAGING | Age: 84
End: 2024-12-15
Attending: EMERGENCY MEDICINE
Payer: MEDICARE

## 2024-12-15 ENCOUNTER — HOSPITAL ENCOUNTER (EMERGENCY)
Age: 84
Discharge: HOME | End: 2024-12-15
Attending: EMERGENCY MEDICINE
Payer: MEDICARE

## 2024-12-15 VITALS
DIASTOLIC BLOOD PRESSURE: 82 MMHG | TEMPERATURE: 98 F | RESPIRATION RATE: 22 BRPM | HEART RATE: 66 BPM | HEIGHT: 64.5 IN | WEIGHT: 120 LBS | BODY MASS INDEX: 20.24 KG/M2 | SYSTOLIC BLOOD PRESSURE: 156 MMHG | OXYGEN SATURATION: 98 %

## 2024-12-15 DIAGNOSIS — J18.9 COMMUNITY ACQUIRED PNEUMONIA OF LEFT LOWER LOBE OF LUNG: ICD-10-CM

## 2024-12-15 DIAGNOSIS — S22.42XA CLOSED FRACTURE OF MULTIPLE RIBS OF LEFT SIDE, INITIAL ENCOUNTER: ICD-10-CM
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
## 2024-12-15 LAB
ALBUMIN SERPL-MCNC: 4.5 G/DL (ref 3.2–4.8)
ALBUMIN/GLOB SERPL: 2 {RATIO} (ref 1–2)
ALP LIVER SERPL-CCNC: 81 U/L
ALT SERPL-CCNC: 10 U/L
ANION GAP SERPL CALC-SCNC: 2 MMOL/L (ref 0–18)
AST SERPL-CCNC: 15 U/L (ref ?–34)
BASOPHILS # BLD AUTO: 0.05 X10(3) UL (ref 0–0.2)
BASOPHILS NFR BLD AUTO: 0.8 %
BILIRUB SERPL-MCNC: 0.3 MG/DL (ref 0.2–1.1)
BUN BLD-MCNC: 19 MG/DL (ref 9–23)
CALCIUM BLD-MCNC: 9.7 MG/DL (ref 8.7–10.4)
CHLORIDE SERPL-SCNC: 109 MMOL/L (ref 98–112)
CO2 SERPL-SCNC: 30 MMOL/L (ref 21–32)
CREAT BLD-MCNC: 0.81 MG/DL
EGFRCR SERPLBLD CKD-EPI 2021: 72 ML/MIN/1.73M2 (ref 60–?)
EOSINOPHIL # BLD AUTO: 0.12 X10(3) UL (ref 0–0.7)
EOSINOPHIL NFR BLD AUTO: 1.9 %
ERYTHROCYTE [DISTWIDTH] IN BLOOD BY AUTOMATED COUNT: 13.9 %
GLOBULIN PLAS-MCNC: 2.2 G/DL (ref 2–3.5)
GLUCOSE BLD-MCNC: 93 MG/DL (ref 70–99)
HCT VFR BLD AUTO: 35.8 %
HGB BLD-MCNC: 11.3 G/DL
IMM GRANULOCYTES # BLD AUTO: 0.01 X10(3) UL (ref 0–1)
IMM GRANULOCYTES NFR BLD: 0.2 %
LYMPHOCYTES # BLD AUTO: 1.52 X10(3) UL (ref 1–4)
LYMPHOCYTES NFR BLD AUTO: 23.5 %
MCH RBC QN AUTO: 28 PG (ref 26–34)
MCHC RBC AUTO-ENTMCNC: 31.6 G/DL (ref 31–37)
MCV RBC AUTO: 88.6 FL
MONOCYTES # BLD AUTO: 0.59 X10(3) UL (ref 0.1–1)
MONOCYTES NFR BLD AUTO: 9.1 %
NEUTROPHILS # BLD AUTO: 4.18 X10 (3) UL (ref 1.5–7.7)
NEUTROPHILS # BLD AUTO: 4.18 X10(3) UL (ref 1.5–7.7)
NEUTROPHILS NFR BLD AUTO: 64.5 %
OSMOLALITY SERPL CALC.SUM OF ELEC: 294 MOSM/KG (ref 275–295)
PLATELET # BLD AUTO: 192 10(3)UL (ref 150–450)
POTASSIUM SERPL-SCNC: 4.3 MMOL/L (ref 3.5–5.1)
PROT SERPL-MCNC: 6.7 G/DL (ref 5.7–8.2)
RBC # BLD AUTO: 4.04 X10(6)UL
SODIUM SERPL-SCNC: 141 MMOL/L (ref 136–145)
WBC # BLD AUTO: 6.5 X10(3) UL (ref 4–11)

## 2024-12-15 PROCEDURE — 96374 THER/PROPH/DIAG INJ IV PUSH: CPT

## 2024-12-15 PROCEDURE — 85025 COMPLETE CBC W/AUTO DIFF WBC: CPT | Performed by: EMERGENCY MEDICINE

## 2024-12-15 PROCEDURE — 96375 TX/PRO/DX INJ NEW DRUG ADDON: CPT

## 2024-12-15 PROCEDURE — 71101 X-RAY EXAM UNILAT RIBS/CHEST: CPT | Performed by: EMERGENCY MEDICINE

## 2024-12-15 PROCEDURE — 99284 EMERGENCY DEPT VISIT MOD MDM: CPT

## 2024-12-15 PROCEDURE — 74174 CTA ABD&PLVS W/CONTRAST: CPT | Performed by: EMERGENCY MEDICINE

## 2024-12-15 PROCEDURE — 99285 EMERGENCY DEPT VISIT HI MDM: CPT

## 2024-12-15 PROCEDURE — 80053 COMPREHEN METABOLIC PANEL: CPT | Performed by: EMERGENCY MEDICINE

## 2024-12-15 RX ORDER — MELOXICAM 7.5 MG/1
7.5 TABLET ORAL DAILY
Qty: 10 TABLET | Refills: 0 | Status: SHIPPED | OUTPATIENT
Start: 2024-12-15 | End: 2024-12-25

## 2024-12-15 RX ORDER — HYDROCODONE BITARTRATE AND ACETAMINOPHEN 5; 325 MG/1; MG/1
1 TABLET ORAL ONCE
Status: COMPLETED | OUTPATIENT
Start: 2024-12-15 | End: 2024-12-15

## 2024-12-15 RX ORDER — HYDROCODONE BITARTRATE AND ACETAMINOPHEN 5; 325 MG/1; MG/1
1 TABLET ORAL EVERY 4 HOURS PRN
Qty: 20 TABLET | Refills: 0 | Status: SHIPPED | OUTPATIENT
Start: 2024-12-15

## 2024-12-15 RX ORDER — MORPHINE SULFATE 4 MG/ML
4 INJECTION, SOLUTION INTRAMUSCULAR; INTRAVENOUS ONCE
Status: COMPLETED | OUTPATIENT
Start: 2024-12-15 | End: 2024-12-15

## 2024-12-15 RX ORDER — ONDANSETRON 2 MG/ML
4 INJECTION INTRAMUSCULAR; INTRAVENOUS ONCE
Status: COMPLETED | OUTPATIENT
Start: 2024-12-15 | End: 2024-12-15

## 2024-12-15 RX ORDER — LIDOCAINE 50 MG/G
2 PATCH TOPICAL EVERY 24 HOURS
Qty: 30 PATCH | Refills: 0 | Status: SHIPPED | OUTPATIENT
Start: 2024-12-15 | End: 2024-12-30

## 2024-12-15 NOTE — ED INITIAL ASSESSMENT (HPI)
Slipped and feel 5 days ago- when trying to get into bed. Landed on left side. Left rib pain, skin tear and bruising to left arm. No head injury. No LOC. No thinners. Pain getting worse.

## 2024-12-16 NOTE — ED PROVIDER NOTES
Patient Seen in: [facility] [place]      History     Chief Complaint   Patient presents with    Fall     Pt was trying to get into bed 4 days ago and fell. Per family the pt has a skin tear to the left arm and left sided rib pain and back pain     Stated Complaint: fall    Subjective:   HPI      84-year-old female presents to the [facility] for evaluation after she had a fall 4 days ago.  Patient states that she was trying to get into her bed she states it is a higher bed and when she was attempting and she fell and lost her balance landing onto her left side she did not strike her head.  She is having pain along her ribs and she also sustained a skin tear.  She feels that things are getting worse and therefore came to the [facility] from for further evaluation.  She has a longstanding history of COPD.  She is also concerned because she has an abdominal aortic aneurysm that she was told is quite large and now she is having some increasing epigastric discomfort.  Normal urination.  No gross hematuria.    Objective:     Past Medical History:    ANXIETY    Aortic aneurysm (HCC)    Cervical radiculitis    COPD (chronic obstructive pulmonary disease) (HCC)    Coronary atherosclerosis of unspecified type of vessel, native or graft    H/O degenerative disc disease    HYPERLIPIDEMIA    Irritable bowel syndrome    Lumbar radiculitis    Lumbar spondylosis    OSTEOARTHRITIS    OSTEOPOROSIS    Other and unspecified personal history of malignant neoplasm    Pulmonary hypertension (HCC)    Venous insufficiency              Past Surgical History:   Procedure Laterality Date    Appendectomy      Colonoscopy      Epidurography, radiological s & i  3/12/2012    Procedure: TRANSFORAMINAL EPIDURAL - LUMBAR;  Surgeon: Joe Gonsales MD;  Location: [facility]    Hernia surgery      Hysterectomy      Injection, anesthetic/steroid, transforaminal epidural; lumbar/sacral, single level   2/3/2012    Performed by MALORIE GALICIA at [facility]    Injection, anesthetic/steroid, transforaminal epidural; lumbar/sacral, single level  2/21/2012    Procedure: TRANSFORAMINAL EPIDURAL - LUMBAR;  Surgeon: Alex Hartman MD;  Location: [facility]    Injection, w/wo contrast, dx/therapeutic substance, epidural/subarachnoid; cervical/thoracic  3/12/2012    Procedure: TRANSFORAMINAL EPIDURAL - LUMBAR;  Surgeon: Joe Gonsales MD;  Location: [facility]    Injection, w/wo contrast, dx/therapeutic substance, epidural/subarachnoid; cervical/thoracic N/A 3/22/2016    Procedure: CERVICAL EPIDURAL;  Surgeon: Joe Gonsales MD;  Location: [facility]    Ir varicose vein endovenous laser ablation(cpt=36478)  2014    left SSV    Ir varicose vein endovenous laser ablation(cpt=36478) Right 09/2020    Laminectomy      laminectomy    M-sedaj by  phys perfrmg svc 5+ yr  2/3/2012    Performed by MALORIE GALICIA at [facility]    M-sedaj by  phys perfrmg svc 5+ yr  2/21/2012    Procedure: TRANSFORAMINAL EPIDURAL - LUMBAR;  Surgeon: Alex Hartman MD;  Location: [facility]    M-sedaj by  phys perfrmg svc 5+ yr  3/12/2012    Procedure: TRANSFORAMINAL EPIDURAL - LUMBAR;  Surgeon: Joe Gonsales MD;  Location: [facility]    M-sedaj by  phys perfrmg svc 5+ yr N/A 3/22/2016    Procedure: CERVICAL EPIDURAL;  Surgeon: Joe Gonsales MD;  Location: [facility]    Other surgical history      anterior repair of pelvic floor    Patient documented not to have experienced any of the following events N/A 3/22/2016    Procedure: CERVICAL EPIDURAL;  Surgeon: Joe Gonsales MD;  Location: [facility]    Patient withough preoperative order for iv antibiotic surgical site infection prophylaxis. N/A 3/22/2016    Procedure: CERVICAL EPIDURAL;  Surgeon: Ilda  MD Joe;  [facility]: [facility]    Repair ing hernia,5+y/o,reducibl      Repair shoulder capsule,anterior      Sclerotherapy Bilateral     Special service or report      removal of colon mass    Spinal fusion      cervical fusion    Tonsillectomy      Total abdom hysterectomy      Vein ligation and stripping Bilateral     about                 Social History     Socioeconomic History    Marital status:    Tobacco Use    Smoking status: Former     Current packs/day: 0.00     Types: Cigarettes     Quit date: 3/1/2005     Years since quittin.8    Smokeless tobacco: Never   Vaping Use    Vaping status: Never Used   Substance and Sexual Activity    Alcohol use: No    Drug use: No     Comment: pt did not respond     Social Drivers of Health     Food Insecurity: No Food Insecurity (10/4/2023)    Food Insecurity     Food Insecurity: Never true   Transportation Needs: No Transportation Needs (10/4/2023)    Transportation Needs     Lack of Transportation: No   Housing Stability: Low Risk  (10/4/2023)    Housing Stability     Housing Instability: No                  Physical Exam     ED Triage Vitals [12/15/24 1438]   BP (!) 181/80   Pulse 70   Resp 26   Temp 98.3 °F (36.8 °C)   Temp src    SpO2 100 %   O2 Device Nasal cannula       Current Vitals:   Vital Signs  BP: 156/82  Pulse: 66  Resp: 22  Temp: 98.3 °F (36.8 °C)    Oxygen Therapy  SpO2: 98 %  O2 Device: Nasal cannula  O2 Flow Rate (L/min): 6 L/min        Physical Exam  Vitals and nursing note reviewed. Chaperone present: Family in the room assisting with history.   Constitutional:       Appearance: Normal appearance. She is well-developed.      Comments: Very slender.  On chronic oxygen, uncomfortable with movement   HENT:      Head: Normocephalic and atraumatic.   Cardiovascular:      Rate and Rhythm: Normal rate and regular rhythm.      Heart sounds: Normal heart sounds.   Pulmonary:      Effort: Pulmonary effort is normal.       Breath sounds: Normal breath sounds. No stridor. No wheezing.      Comments: Distinct chest wall tenderness lower ribs with no crepitus  Chest:      Chest wall: Tenderness present.   Abdominal:      General: Bowel sounds are normal.      Palpations: Abdomen is soft.      Tenderness: There is abdominal tenderness. There is no rebound.      Comments: Epigastric and left upper quadrant tenderness but no rebound   Musculoskeletal:         General: Tenderness present. Normal range of motion.      Cervical back: Normal range of motion and neck supple.      Comments: Skin tear on upper left arm without signs of secondary infection otherwise neurovascular intact no deformity full range of motion of all other extremities   Lymphadenopathy:      Cervical: No cervical adenopathy.   Skin:     General: Skin is warm and dry.      Coloration: Skin is not pale.   Neurological:      General: No focal deficit present.      Mental Status: She is alert and oriented to person, place, and time.      Cranial Nerves: No cranial nerve deficit.      Coordination: Coordination normal.            ED Course     Labs Reviewed   CBC WITH DIFFERENTIAL WITH PLATELET - Abnormal; Notable for the following components:       Result Value    HGB 11.3 (*)     All other components within normal limits   COMP METABOLIC PANEL (14) - Normal            CTA ABD/PEL (CPT=74174)    Result Date: 12/15/2024  PROCEDURE:  CTA ABD/PEL (CPT=74174)  COMPARISON:  [facility], CT, CT ABDOMEN+PELVIS(CONTRAST ONLY)(CPT=74177), 8/08/2024, 3:10 PM.  INDICATIONS:  fall, epigastric pain, known AAA  TECHNIQUE:  CT images of the abdomen and pelvis were obtained pre- and post- injection of non-ionic intravenous contrast material. Multi-planar reformatted/3-D images were created to optimize visualization of vascular anatomy.  Dose reduction techniques were used. Dose information is transmitted to the ACR (American College of Radiology) NRDR (National Radiology Data Registry) which  includes the Dose Index Registry.  PATIENT STATED HISTORY:(As transcribed by Technologist)  Patient slipped and feel 5 days ago- when trying to get into bed. Landed on left side with rib pain, skin tear and bruising to left arm.    CONTRAST USED:  80cc of Isovue 370  FINDINGS:  AORTA/VASCULAR:  There is fusiform dilatation of the abdominal aorta.  Maximal AP dimension is measured at 4.8 cm, remeasured in a similar manner 4.8 cm.  There is minimal lining mural thrombus.  There is no evidence of aneurysm rupture.  Ectasia the left common iliac artery.  Diameter measured 1.4 cm.   Patent celiac artery, SMA and JUSTYNA.  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  The JUSTYNA is a small vessel.  Mild SMA origin disease.   High-grade stenosis origin-proximal right renal artery.  Moderate on the left.  Typical position of the left renal vein, anterior to the abdominal aorta.  LIVER:  Uniform parenchyma.  Smooth contour. BILIARY:  Surgically absent gallbladder.  No biliary dilatation. PANCREAS:  Uniform parenchyma.  No ductal dilatation. SPLEEN:  Not enlarged. KIDNEYS:  Normal anatomic positions.  No hydronephrosis or perinephric stranding.  Uniform parenchymal enhancement. ADRENALS:  Not enlarged. RETROPERITONEUM:  No adenopathy or hematoma. BOWEL/MESENTERY:  Normal bowel caliber.  Moderate to severe colonic diverticulosis.  No acute diverticulitis.  Large amount of stool scattered throughout the colon.  No free air or ascites. ABDOMINAL WALL:  Small fat containing left inguinal hernia.  No focal hematoma. URINARY BLADDER:  Moderately filled.  Smooth contour.  No wall thickening or calculus within. PELVIC NODES:  None enlarged. PELVIC ORGANS:  Surgically absent uterus. BONES:  Maintained vertebral body heights.  No subluxation.  Moderate disc degenerative changes.  Moderate to severe mid to lower lumbar facet arthropathy.   Nondisplaced posterior left 11th and 12th rib fractures. LUNG BASES:  Consolidation bilaterally, left  greater than right.  No pleural effusion. OTHER:  None.             CONCLUSION:  1. Stable size of an abdominal aortic aneurysm.  No evidence of rupture. 2. Two nondisplaced left posterior rib fractures. 3. Bibasilar lung consolidation.  Correlate for pneumonia.  Follow-up to radiographic resolution recommended. 4. Uncomplicated colonic diverticulosis.  5. Details as above.  Continued clinical correlation recommended.    LOCATION:  [facility]   Dictated by (CST): Bob Peoples MD on 12/15/2024 at 5:56 PM     Finalized by (CST): Bob Peoples MD on 12/15/2024 at 6:06 PM       XR RIBS WITH CHEST (3 VIEWS), LEFT  (CPT=71101)    Result Date: 12/15/2024  PROCEDURE:  XR RIBS WITH CHEST (3 VIEWS), LEFT  (CPT=71101)  TECHNIQUE:  PA Chest and three views of the ribs were obtained  COMPARISON:  [facility], XR, XR CHEST AP PORTABLE  (CPT=71045), 10/04/2023, 4:26 PM.  INDICATIONS:  fall  PATIENT STATED HISTORY: (As transcribed by Technologist)  Patient has left rib pain after falling on 12/10 onto her left side. Patient stated it is anterior and posterior.    FINDINGS:  LUNGS:  There is subsegmental atelectasis at the lung bases.  No acute airspace disease. CARDIAC:  Normal size cardiac silhouette. MEDIASTINUM:  Normal. PLEURA:  Normal. BONES:  Normal for age.  No rib fracture or lesion. SOFT TISSUES:  Negative.            CONCLUSION:   1. No acute bony injury.  2. Subsegmental atelectasis at the lung bases.   LOCATION:  [facility]     Dictated by (CST): Arsh Yuen MD on 12/15/2024 at 3:46 PM     Finalized by (CST): Arsh Yuen MD on 12/15/2024 at 3:48 PM            MDM    Patient had IV established and blood work obtained she was given a dose of morphine for pain.  Patient was repeatedly asking for medications.  We did do some Toradol as well.  She had some initial plain x-rays that personally reviewed did not appreciate any pneumothorax or obvious pleural effusion or significant displaced rib fracture reviewed radiology report they  did not note any acute bony injury either they felt that she had some subsegmental atelectasis.  Patient was concerned about her aneurysm and I reviewed her previous records she did have an ultrasound done last week and she is at approximately 5 cm.  She is having increasing epigastric pain.  Subsequently did do a CTA of her abdomen is also could look at her spleen to make sure there is no splenic injury there was noted to be none displaced rib fractures on CT and perhaps some early infiltrates.  Otherwise they noted her aneurysm to be 4.8 cm and not leaking.  No other significant abnormalities.  On further review of records patient does receive hydrocodone daily and most likely she is desensitized to narcotic pain medications.  However with her COPD I do not feel comfortable significantly increasing her pain medicines she will be given NSAIDs as well as Lidoderm patches.  I highly advised that she contact her pain management specialist to further help with her rib fractures she was shown how to do incentive spirometry and is to do that every 2-4 hours while she is awake.  She was discharged in good condition    Patient's skin tear was cleaned and dressed by the patient care technician          Medical Decision Making      Disposition and Plan     Clinical Impression:  1. Fall, initial encounter    2. Closed fracture of multiple ribs of left side, initial encounter    3. Community acquired pneumonia of left lower lobe of lung         Disposition:  Discharge  12/15/2024  6:42 pm    Follow-up:  Gisele Faulkner MD  [address] [place] [id number] [phone]    Schedule an appointment as soon as possible for a visit            Medications Prescribed:  Current Discharge Medication List        START taking these medications    Details   amoxicillin clavulanate 875-125 MG Oral Tab Take 1 tablet by mouth 2 (two) times daily for 10 days.  Qty: 20 tablet, Refills: 0    Associated Diagnoses: Community acquired  pneumonia of left lower lobe of lung      !! HYDROcodone-acetaminophen 5-325 MG Oral Tab Take 1 tablet by mouth every 4 (four) hours as needed for Pain.  Qty: 20 tablet, Refills: 0    Associated Diagnoses: Closed fracture of multiple ribs of left side, initial encounter      Meloxicam 7.5 MG Oral Tab Take 1 tablet (7.5 mg total) by mouth daily for 10 days.  Qty: 10 tablet, Refills: 0    Associated Diagnoses: Closed fracture of multiple ribs of left side, initial encounter      lidocaine 5 % External Patch Place 2 patches onto the skin daily for 15 days.  Qty: 30 patch, Refills: 0    Associated Diagnoses: Closed fracture of multiple ribs of left side, initial encounter       !! - Potential duplicate medications found. Please discuss with provider.              Supplementary Documentation:

## 2025-01-29 RX ORDER — FORMOTEROL FUMARATE 20 UG/2ML
20 SOLUTION RESPIRATORY (INHALATION) EVERY 12 HOURS
Status: ON HOLD | COMMUNITY
End: 2025-02-10

## 2025-01-29 RX ORDER — LEVOFLOXACIN 500 MG/1
500 TABLET, FILM COATED ORAL DAILY
COMMUNITY
End: 2025-01-30

## 2025-01-29 RX ORDER — BUDESONIDE 0.5 MG/2ML
0.5 INHALANT ORAL DAILY
Status: ON HOLD | COMMUNITY
End: 2025-02-10

## 2025-01-29 RX ORDER — HYDROCODONE BITARTRATE AND ACETAMINOPHEN 10; 325 MG/1; MG/1
1 TABLET ORAL EVERY 8 HOURS PRN
COMMUNITY

## 2025-01-29 RX ORDER — VITAMIN B COMPLEX
1 TABLET ORAL DAILY
COMMUNITY

## 2025-01-29 RX ORDER — HYDROCODONE BITARTRATE AND ACETAMINOPHEN 5; 325 MG/1; MG/1
1 TABLET ORAL EVERY 6 HOURS PRN
COMMUNITY

## 2025-01-30 NOTE — PAT NURSING NOTE
Pre-Procedure Instructions     Pre-Procedure Instructions: Encouraged to check in electronically via [employer]     Visitor Instructions     Adult Patients: 2 Adult Care Partners can accompany the patient day of procedure. 2 Care Partners may visit [phone] during inpatient stay     Pre-Op Instructions     Scheduled Surgery: You are scheduled for Vascular Surgery     Date of Procedure: 02/10/25 Monday     Diet Instructions: Do not eat or drink after 10pm. This includes water, gum, candy and food.     Medication Instructions: Do not take any medications the morning of your surgery. OK to use inhalers as normal.     Medications to Stop: Last dose of vitamins, supplements, and NSAID's (ex. Ibuprofen, Excederin, Aleve, Diclofenac, and any gels having steroids) 7 days prior to surgery  on 2/2 Sunday (Do not count the date of surgery)     Skin Prep: Shower with Dial Soap the morning of your surgery (or any antibacterial soap).     On the day of your procedure please make sure to arrive at: 0630 am     This is going to be a tentative time of arrival for surgery. We will call you the buisness day prior to your surgery in the late afternoon to reconfirm your arrival. Please check your voicemail for a message.     Admit/Discharge Status: You will be admitted to the hospital after surgery.     Discharge Teaching: Your nurse will give you specific instructions before discharge.;Any questions, please call the surgeon's office     Additional Instructions: Bring insurance card(s) and picture ID;Leave all valuables at home, including jewelry;Wear appropriate clothing;No contacts, wear glasses;You'll receive arrival time 1 business day prior to scheduled surgery     Park in the [facility].  Check in at the [facility] reception desk. Our  will be there to check you in for your procedure. Please bring your insurance cards and ID with you. [employer] is available starting at 6  am     If you are scheduled to arrive early for 5:30 am, the [facility] reception desk does not open till 5:30 am. It may be dark, but you are in the correct location.      We are open M-F from 8am- 5pm. We are closed on holidays and weekends. We can be reached at [phone].            Thank you,

## 2025-02-09 ENCOUNTER — ANESTHESIA EVENT (OUTPATIENT)
Dept: CARDIAC SURGERY | Facility: HOSPITAL | Age: 85
End: 2025-02-09
Payer: MEDICARE

## 2025-02-10 ENCOUNTER — ANESTHESIA (OUTPATIENT)
Dept: CARDIAC SURGERY | Facility: HOSPITAL | Age: 85
End: 2025-02-10
Payer: MEDICARE

## 2025-02-10 ENCOUNTER — HOSPITAL ENCOUNTER (INPATIENT)
Facility: HOSPITAL | Age: 85
LOS: 3 days | Discharge: HOME | End: 2025-02-13
Attending: SURGERY | Admitting: SURGERY
Payer: MEDICARE

## 2025-02-10 ENCOUNTER — APPOINTMENT (OUTPATIENT)
Dept: GENERAL RADIOLOGY | Facility: HOSPITAL | Age: 85
End: 2025-02-10
Attending: INTERNAL MEDICINE
Payer: MEDICARE

## 2025-02-10 DIAGNOSIS — Z01.818 PRE-OP TESTING: ICD-10-CM

## 2025-02-10 DIAGNOSIS — I71.42 JUXTARENAL ABDOMINAL AORTIC ANEURYSM (AAA) WITHOUT RUPTURE: Primary | ICD-10-CM

## 2025-02-10 LAB
ADENOVIRUS PCR:: NOT DETECTED
ALBUMIN SERPL-MCNC: 4.2 G/DL (ref 3.2–4.8)
ALBUMIN/GLOB SERPL: 1.8 {RATIO} (ref 1–2)
ALP LIVER SERPL-CCNC: 80 U/L
ALT SERPL-CCNC: 11 U/L
ANION GAP SERPL CALC-SCNC: 9 MMOL/L (ref 0–18)
ANTIBODY SCREEN: NEGATIVE
APTT PPP: 33.2 SECONDS (ref 23–36)
AST SERPL-CCNC: 34 U/L (ref ?–34)
ATRIAL RATE: 66 BPM
B PARAPERT DNA SPEC QL NAA+PROBE: NOT DETECTED
B PERT DNA SPEC QL NAA+PROBE: NOT DETECTED
BASE EXCESS BLD CALC-SCNC: -2 MMOL/L
BASE EXCESS BLD CALC-SCNC: -2 MMOL/L
BILIRUB SERPL-MCNC: 0.3 MG/DL (ref 0.2–1.1)
BUN BLD-MCNC: 17 MG/DL (ref 9–23)
C PNEUM DNA SPEC QL NAA+PROBE: NOT DETECTED
CA-I BLD-SCNC: 1.15 MMOL/L (ref 1.12–1.32)
CA-I BLD-SCNC: 1.16 MMOL/L (ref 1.12–1.32)
CALCIUM BLD-MCNC: 8.6 MG/DL (ref 8.7–10.6)
CHLORIDE SERPL-SCNC: 108 MMOL/L (ref 98–112)
CO2 BLD-SCNC: 25 MMOL/L (ref 22–32)
CO2 BLD-SCNC: 25 MMOL/L (ref 22–32)
CO2 SERPL-SCNC: 25 MMOL/L (ref 21–32)
CORONAVIRUS 229E PCR:: NOT DETECTED
CORONAVIRUS HKU1 PCR:: NOT DETECTED
CORONAVIRUS NL63 PCR:: NOT DETECTED
CORONAVIRUS OC43 PCR:: NOT DETECTED
CREAT BLD-MCNC: 0.76 MG/DL
EGFRCR SERPLBLD CKD-EPI 2021: 78 ML/MIN/1.73M2 (ref 60–?)
ERYTHROCYTE [DISTWIDTH] IN BLOOD BY AUTOMATED COUNT: 13.9 %
FLUAV RNA SPEC QL NAA+PROBE: NOT DETECTED
FLUBV RNA SPEC QL NAA+PROBE: NOT DETECTED
GLOBULIN PLAS-MCNC: 2.3 G/DL (ref 2–3.5)
GLUCOSE BLD-MCNC: 100 MG/DL (ref 70–99)
GLUCOSE BLD-MCNC: 144 MG/DL (ref 70–99)
GLUCOSE BLD-MCNC: 157 MG/DL (ref 70–99)
HCO3 BLD-SCNC: 23.7 MEQ/L
HCO3 BLD-SCNC: 23.9 MEQ/L
HCT VFR BLD AUTO: 33.5 %
HCT VFR BLD CALC: 29 %
HCT VFR BLD CALC: 30 %
HGB BLD-MCNC: 10.9 G/DL
INR BLD: 1 (ref 0.8–1.2)
ISTAT ACTIVATED CLOTTING TIME: 141 SECONDS (ref 74–137)
ISTAT ACTIVATED CLOTTING TIME: 250 SECONDS (ref 74–137)
ISTAT ACTIVATED CLOTTING TIME: 268 SECONDS (ref 74–137)
ISTAT ACTIVATED CLOTTING TIME: 268 SECONDS (ref 74–137)
MCH RBC QN AUTO: 28.4 PG (ref 26–34)
MCHC RBC AUTO-ENTMCNC: 32.5 G/DL (ref 31–37)
MCV RBC AUTO: 87.2 FL
METAPNEUMOVIRUS PCR:: NOT DETECTED
MRSA DNA SPEC QL NAA+PROBE: NEGATIVE
MYCOPLASMA PNEUMONIA PCR:: NOT DETECTED
OSMOLALITY SERPL CALC.SUM OF ELEC: 298 MOSM/KG (ref 275–295)
P AXIS: 39 DEGREES
P-R INTERVAL: 148 MS
PARAINFLUENZA 1 PCR:: NOT DETECTED
PARAINFLUENZA 2 PCR:: NOT DETECTED
PARAINFLUENZA 3 PCR:: NOT DETECTED
PARAINFLUENZA 4 PCR:: NOT DETECTED
PCO2 BLD: 42 MMHG
PCO2 BLD: 46.1 MMHG
PH BLD: 7.32 [PH]
PH BLD: 7.36 [PH]
PLATELET # BLD AUTO: 157 10(3)UL (ref 150–450)
PO2 BLD: 298 MMHG
PO2 BLD: 371 MMHG
POTASSIUM BLD-SCNC: 3.5 MMOL/L (ref 3.6–5.1)
POTASSIUM BLD-SCNC: 3.7 MMOL/L (ref 3.6–5.1)
POTASSIUM SERPL-SCNC: 5.6 MMOL/L (ref 3.5–5.1)
PROT SERPL-MCNC: 6.5 G/DL (ref 5.7–8.2)
PROTHROMBIN TIME: 13.3 SECONDS (ref 11.6–14.8)
Q-T INTERVAL: 408 MS
QRS DURATION: 84 MS
QTC CALCULATION (BEZET): 427 MS
R AXIS: 50 DEGREES
RBC # BLD AUTO: 3.84 X10(6)UL
RH BLOOD TYPE: POSITIVE
RH BLOOD TYPE: POSITIVE
RHINOVIRUS/ENTERO PCR:: NOT DETECTED
RSV RNA SPEC QL NAA+PROBE: NOT DETECTED
SAO2 % BLD: 100 %
SAO2 % BLD: 100 %
SARS-COV-2 RNA NPH QL NAA+NON-PROBE: NOT DETECTED
SODIUM BLD-SCNC: 142 MMOL/L (ref 136–145)
SODIUM BLD-SCNC: 144 MMOL/L (ref 136–145)
SODIUM SERPL-SCNC: 142 MMOL/L (ref 136–145)
T AXIS: 33 DEGREES
TROPONIN I SERPL HS-MCNC: <3 NG/L
VENTRICULAR RATE: 66 BPM
WBC # BLD AUTO: 12.1 X10(3) UL (ref 4–11)

## 2025-02-10 PROCEDURE — 04793ZZ DILATION OF RIGHT RENAL ARTERY, PERCUTANEOUS APPROACH: ICD-10-PCS | Performed by: SURGERY

## 2025-02-10 PROCEDURE — 71045 X-RAY EXAM CHEST 1 VIEW: CPT | Performed by: INTERNAL MEDICINE

## 2025-02-10 PROCEDURE — 99223 1ST HOSP IP/OBS HIGH 75: CPT | Performed by: INTERNAL MEDICINE

## 2025-02-10 PROCEDURE — B41CYZZ FLUOROSCOPY OF PELVIC ARTERIES USING OTHER CONTRAST: ICD-10-PCS | Performed by: SURGERY

## 2025-02-10 PROCEDURE — 5A0945A ASSISTANCE WITH RESPIRATORY VENTILATION, 24-96 CONSECUTIVE HOURS, HIGH NASAL FLOW/VELOCITY: ICD-10-PCS | Performed by: SURGERY

## 2025-02-10 PROCEDURE — B414YZZ FLUOROSCOPY OF SUPERIOR MESENTERIC ARTERY USING OTHER CONTRAST: ICD-10-PCS | Performed by: SURGERY

## 2025-02-10 PROCEDURE — B440ZZ3 ULTRASONOGRAPHY OF ABDOMINAL AORTA, INTRAVASCULAR: ICD-10-PCS | Performed by: SURGERY

## 2025-02-10 PROCEDURE — B418YZZ FLUOROSCOPY OF BILATERAL RENAL ARTERIES USING OTHER CONTRAST: ICD-10-PCS | Performed by: SURGERY

## 2025-02-10 PROCEDURE — 047J3ZZ DILATION OF LEFT EXTERNAL ILIAC ARTERY, PERCUTANEOUS APPROACH: ICD-10-PCS | Performed by: SURGERY

## 2025-02-10 PROCEDURE — 04V03FZ RESTRICTION OF ABDOMINAL AORTA WITH BRANCHED OR FENESTRATED INTRALUMINAL DEVICE, THREE OR MORE ARTERIES, PERCUTANEOUS APPROACH: ICD-10-PCS | Performed by: SURGERY

## 2025-02-10 PROCEDURE — B410YZZ FLUOROSCOPY OF ABDOMINAL AORTA USING OTHER CONTRAST: ICD-10-PCS | Performed by: SURGERY

## 2025-02-10 PROCEDURE — B44HZZ3 ULTRASONOGRAPHY OF BILATERAL LOWER EXTREMITY ARTERIES, INTRAVASCULAR: ICD-10-PCS | Performed by: SURGERY

## 2025-02-10 DEVICE — IMPLANTABLE DEVICE: Type: IMPLANTABLE DEVICE | Status: FUNCTIONAL

## 2025-02-10 DEVICE — IMPLANTABLE DEVICE: Type: IMPLANTABLE DEVICE | Site: ABDOMEN | Status: FUNCTIONAL

## 2025-02-10 DEVICE — ICAST COVERED STENT SYSTEM, 6MMX38MMX120CM
Type: IMPLANTABLE DEVICE | Status: FUNCTIONAL
Brand: ICAST COVERED STENT SYSTEM, 6MMX38MMX120CM

## 2025-02-10 DEVICE — ZENITH, SPIRAL-Z AAA ILIAC LEG
Type: IMPLANTABLE DEVICE | Status: FUNCTIONAL
Brand: ZENITH SPIRAL-Z

## 2025-02-10 DEVICE — ICAST COVERED STENT SYSTEM, 7MMX22MMX120CM
Type: IMPLANTABLE DEVICE | Status: FUNCTIONAL
Brand: ICAST COVERED STENT SYSTEM, 7MMX22MMX120CM

## 2025-02-10 RX ORDER — LIDOCAINE HYDROCHLORIDE 10 MG/ML
INJECTION, SOLUTION EPIDURAL; INFILTRATION; INTRACAUDAL; PERINEURAL AS NEEDED
Status: DISCONTINUED | OUTPATIENT
Start: 2025-02-10 | End: 2025-02-10 | Stop reason: SURG

## 2025-02-10 RX ORDER — ROCURONIUM BROMIDE 10 MG/ML
INJECTION, SOLUTION INTRAVENOUS AS NEEDED
Status: DISCONTINUED | OUTPATIENT
Start: 2025-02-10 | End: 2025-02-10 | Stop reason: SURG

## 2025-02-10 RX ORDER — BUDESONIDE 0.5 MG/2ML
0.5 INHALANT ORAL DAILY
Status: DISCONTINUED | OUTPATIENT
Start: 2025-02-10 | End: 2025-02-10 | Stop reason: ALTCHOICE

## 2025-02-10 RX ORDER — HYDROMORPHONE HYDROCHLORIDE 1 MG/ML
0.6 INJECTION, SOLUTION INTRAMUSCULAR; INTRAVENOUS; SUBCUTANEOUS EVERY 5 MIN PRN
Status: DISPENSED | OUTPATIENT
Start: 2025-02-10 | End: 2025-02-10

## 2025-02-10 RX ORDER — ONDANSETRON 2 MG/ML
4 INJECTION INTRAMUSCULAR; INTRAVENOUS EVERY 6 HOURS PRN
Status: DISCONTINUED | OUTPATIENT
Start: 2025-02-10 | End: 2025-02-13

## 2025-02-10 RX ORDER — HEPARIN SODIUM 1000 [USP'U]/ML
INJECTION, SOLUTION INTRAVENOUS; SUBCUTANEOUS AS NEEDED
Status: DISCONTINUED | OUTPATIENT
Start: 2025-02-10 | End: 2025-02-10 | Stop reason: SURG

## 2025-02-10 RX ORDER — DEXAMETHASONE SODIUM PHOSPHATE 4 MG/ML
VIAL (ML) INJECTION AS NEEDED
Status: DISCONTINUED | OUTPATIENT
Start: 2025-02-10 | End: 2025-02-10 | Stop reason: SURG

## 2025-02-10 RX ORDER — HYDROMORPHONE HYDROCHLORIDE 1 MG/ML
0.4 INJECTION, SOLUTION INTRAMUSCULAR; INTRAVENOUS; SUBCUTANEOUS EVERY 5 MIN PRN
Status: ACTIVE | OUTPATIENT
Start: 2025-02-10 | End: 2025-02-10

## 2025-02-10 RX ORDER — ONDANSETRON 2 MG/ML
INJECTION INTRAMUSCULAR; INTRAVENOUS AS NEEDED
Status: DISCONTINUED | OUTPATIENT
Start: 2025-02-10 | End: 2025-02-10 | Stop reason: SURG

## 2025-02-10 RX ORDER — PANTOPRAZOLE SODIUM 40 MG/1
40 TABLET, DELAYED RELEASE ORAL
Status: DISCONTINUED | OUTPATIENT
Start: 2025-02-10 | End: 2025-02-13

## 2025-02-10 RX ORDER — NALOXONE HYDROCHLORIDE 0.4 MG/ML
0.08 INJECTION, SOLUTION INTRAMUSCULAR; INTRAVENOUS; SUBCUTANEOUS AS NEEDED
Status: ACTIVE | OUTPATIENT
Start: 2025-02-10 | End: 2025-02-10

## 2025-02-10 RX ORDER — METOCLOPRAMIDE HYDROCHLORIDE 5 MG/ML
10 INJECTION INTRAMUSCULAR; INTRAVENOUS EVERY 8 HOURS PRN
Status: DISCONTINUED | OUTPATIENT
Start: 2025-02-10 | End: 2025-02-10

## 2025-02-10 RX ORDER — ACETAMINOPHEN 325 MG/1
650 TABLET ORAL EVERY 4 HOURS PRN
Status: DISCONTINUED | OUTPATIENT
Start: 2025-02-10 | End: 2025-02-13

## 2025-02-10 RX ORDER — METOCLOPRAMIDE HYDROCHLORIDE 5 MG/ML
5 INJECTION INTRAMUSCULAR; INTRAVENOUS EVERY 8 HOURS PRN
Status: DISCONTINUED | OUTPATIENT
Start: 2025-02-10 | End: 2025-02-13

## 2025-02-10 RX ORDER — SODIUM CHLORIDE 9 MG/ML
INJECTION, SOLUTION INTRAVENOUS CONTINUOUS PRN
Status: DISCONTINUED | OUTPATIENT
Start: 2025-02-10 | End: 2025-02-10 | Stop reason: SURG

## 2025-02-10 RX ORDER — ALBUTEROL SULFATE 90 UG/1
2 INHALANT RESPIRATORY (INHALATION) 4 TIMES DAILY PRN
Status: DISCONTINUED | OUTPATIENT
Start: 2025-02-10 | End: 2025-02-13

## 2025-02-10 RX ORDER — PROTAMINE SULFATE 10 MG/ML
INJECTION, SOLUTION INTRAVENOUS AS NEEDED
Status: DISCONTINUED | OUTPATIENT
Start: 2025-02-10 | End: 2025-02-10 | Stop reason: SURG

## 2025-02-10 RX ORDER — HYDROMORPHONE HYDROCHLORIDE 1 MG/ML
0.2 INJECTION, SOLUTION INTRAMUSCULAR; INTRAVENOUS; SUBCUTANEOUS ONCE
Status: COMPLETED | OUTPATIENT
Start: 2025-02-10 | End: 2025-02-10

## 2025-02-10 RX ORDER — FLUTICASONE PROPIONATE AND SALMETEROL 250; 50 UG/1; UG/1
1 POWDER RESPIRATORY (INHALATION) 2 TIMES DAILY
Status: DISCONTINUED | OUTPATIENT
Start: 2025-02-10 | End: 2025-02-13

## 2025-02-10 RX ORDER — SODIUM CHLORIDE, SODIUM LACTATE, POTASSIUM CHLORIDE, CALCIUM CHLORIDE 600; 310; 30; 20 MG/100ML; MG/100ML; MG/100ML; MG/100ML
INJECTION, SOLUTION INTRAVENOUS CONTINUOUS
Status: DISCONTINUED | OUTPATIENT
Start: 2025-02-10 | End: 2025-02-13

## 2025-02-10 RX ORDER — HYDROMORPHONE HYDROCHLORIDE 1 MG/ML
INJECTION, SOLUTION INTRAMUSCULAR; INTRAVENOUS; SUBCUTANEOUS
Status: COMPLETED
Start: 2025-02-10 | End: 2025-02-10

## 2025-02-10 RX ORDER — KETOROLAC TROMETHAMINE 15 MG/ML
15 INJECTION, SOLUTION INTRAMUSCULAR; INTRAVENOUS EVERY 6 HOURS SCHEDULED
Status: COMPLETED | OUTPATIENT
Start: 2025-02-10 | End: 2025-02-12

## 2025-02-10 RX ORDER — HYDROCODONE BITARTRATE AND ACETAMINOPHEN 5; 325 MG/1; MG/1
1 TABLET ORAL EVERY 4 HOURS PRN
Status: DISCONTINUED | OUTPATIENT
Start: 2025-02-10 | End: 2025-02-13

## 2025-02-10 RX ORDER — HYDROMORPHONE HYDROCHLORIDE 1 MG/ML
0.2 INJECTION, SOLUTION INTRAMUSCULAR; INTRAVENOUS; SUBCUTANEOUS EVERY 5 MIN PRN
Status: ACTIVE | OUTPATIENT
Start: 2025-02-10 | End: 2025-02-10

## 2025-02-10 RX ORDER — HYDROCODONE BITARTRATE AND ACETAMINOPHEN 5; 325 MG/1; MG/1
2 TABLET ORAL EVERY 4 HOURS PRN
Status: DISCONTINUED | OUTPATIENT
Start: 2025-02-10 | End: 2025-02-13

## 2025-02-10 RX ORDER — HEPARIN SODIUM 5000 [USP'U]/ML
5000 INJECTION, SOLUTION INTRAVENOUS; SUBCUTANEOUS EVERY 8 HOURS SCHEDULED
Status: DISCONTINUED | OUTPATIENT
Start: 2025-02-10 | End: 2025-02-13

## 2025-02-10 RX ORDER — ONDANSETRON 2 MG/ML
4 INJECTION INTRAMUSCULAR; INTRAVENOUS EVERY 6 HOURS PRN
Status: DISCONTINUED | OUTPATIENT
Start: 2025-02-10 | End: 2025-02-10

## 2025-02-10 RX ORDER — SODIUM CHLORIDE, SODIUM LACTATE, POTASSIUM CHLORIDE, CALCIUM CHLORIDE 600; 310; 30; 20 MG/100ML; MG/100ML; MG/100ML; MG/100ML
INJECTION, SOLUTION INTRAVENOUS CONTINUOUS
Status: DISCONTINUED | OUTPATIENT
Start: 2025-02-10 | End: 2025-02-10

## 2025-02-10 RX ADMIN — ONDANSETRON 4 MG: 2 INJECTION INTRAMUSCULAR; INTRAVENOUS at 10:57:00

## 2025-02-10 RX ADMIN — ROCURONIUM BROMIDE 40 MG: 10 INJECTION, SOLUTION INTRAVENOUS at 08:15:00

## 2025-02-10 RX ADMIN — SODIUM CHLORIDE: 9 INJECTION, SOLUTION INTRAVENOUS at 08:04:00

## 2025-02-10 RX ADMIN — ROCURONIUM BROMIDE 10 MG: 10 INJECTION, SOLUTION INTRAVENOUS at 09:29:00

## 2025-02-10 RX ADMIN — ROCURONIUM BROMIDE 20 MG: 10 INJECTION, SOLUTION INTRAVENOUS at 08:49:00

## 2025-02-10 RX ADMIN — LIDOCAINE HYDROCHLORIDE 50 MG: 10 INJECTION, SOLUTION EPIDURAL; INFILTRATION; INTRACAUDAL; PERINEURAL at 08:13:00

## 2025-02-10 RX ADMIN — HEPARIN SODIUM 1000 UNITS: 1000 INJECTION, SOLUTION INTRAVENOUS; SUBCUTANEOUS at 08:57:00

## 2025-02-10 RX ADMIN — HEPARIN SODIUM 8000 UNITS: 1000 INJECTION, SOLUTION INTRAVENOUS; SUBCUTANEOUS at 08:46:00

## 2025-02-10 RX ADMIN — PROTAMINE SULFATE 40 MG: 10 INJECTION, SOLUTION INTRAVENOUS at 10:57:00

## 2025-02-10 RX ADMIN — DEXAMETHASONE SODIUM PHOSPHATE 4 MG: 4 MG/ML VIAL (ML) INJECTION at 08:19:00

## 2025-02-10 NOTE — OPERATIVE REPORT
Vascular Surgery Procedure Note  Finn Carl  [id number]  10/14/1940          Date: 01/25/2025        Procedure: Physician Planning of a patient specific fenestrated visceral aortic graft requiring a minimum of 90 minutes of physician time. CPT 15049     84-year-old female with numerous comorbidities who presented to clinic with juxtarenal aortic aneurysm.  On careful review it was determined that her options were open repair or attempt at endovascular repair. She underwent preoperative planning that was extensive and would require  a zenith fenestrated graft.  Therefore measurements were performed to create three fenestrations for the SMA and renal arteries.  The measurements were recorded with the plan to perform on 02/10/2025. Over 90 minutes was used in measuring and creating the endograft for this patient. See operative dictation for procedure details.      Jethro Monterroso MD  [facility]  Vascular Surgery

## 2025-02-10 NOTE — OPERATIVE REPORT
Vascular Surgery Op Note  Patients Name: Finn Carl       Operating Physician: Jethro Monterroso MD     MRN: [id number]                                             YOB: 1940      Operation Date: 02/10/2025           Pre-Operative Diagnosis:   1. Juxtarenal aortic aneurysm  2. Hypertension  3. COPD  4. Tobacco abuse     Post-Operative Diagnosis:   1. Juxtarenal aortic aneurysm  2. Hypertension  3. COPD  4. Tobacco abuse     Procedure Performed:   1. Ultrasound Guided Acces of Bilateral common femoral arteries.  2. Aortogram and pelvic angiogram with radiologic supervision interpretation   3.  Selective catheterization to first order branches of the aorta ( SMA, Right renal, Left renal )   4. Angioplasty of Right renal artery high grade stenosis with 6mm balloon.  5. Fenestrated endovascular aortic repair with placement of Cook Zfen 24x109 Endograft with 3 fenestrations (SMA, right renal, left renal ) CPT 71434  SMA 7x22 iCast    Right renal 6x38 iCast  Left  renal 7x22 iCast  Right Iliac Contra ZSLE 11x74  Left Iliac Ipsi  ZSLE 13x56  6. Intravascular Ultrasound of Left external iliac, Left common iliac, Right external iliac, right common iliac, and Aorta  7. Angioplasty and stenting of left external iliac with 8mm ev3 stent post dilated with 6mm balloon.  8. Closure of bilateral groin with proglide perclose suture ( 20 Fr Left, 18 Fr Right)           Surgeon:  Jethro Monterroso MD     Co-Surgeon:  Dick Freitas MD     Due to the complexity of the case with the extent of the aneurysm and his medical complexity and comorbidities, I asked my partner to participate in this operation as a co-surgeon.     Anesthesia:   General        EBL: 200cc     Complications: None     Drains: None     Findings: At completion no endoleak with preservation of flow in all visceral vessels.     Disposition: Extubated and taken to the ICU in stable condition.  He will lay flat for 2 hours and be monitored closely.  At  completion of the procedure he was noted to have strong doppler signal consistent with preoperative baseline in the bilateral lower extremities     Indications for procedure: This is an 84-year-old female with numerous comorbidities with juxtarenal aortic aneurysm. On careful review it was determined that her options were open repair or attempt at endovascular repair.  She was an excellent candidate for a zenith fenestrated. We discussed the risks and benefits of the procedure not limited to risk of bleeding, infection, stroke, myocardial infarction, renal insufficiency, spinal cord ischemia, death.  She affirmed his understanding and strongly desired to proceed.     On the morning of 02/10/2025, the patient was brought into the operating room and placed in supine position.  General anesthesia was induced without any issues.  A Moreno catheter and arterial line were placed in sterile fashion without any issues.  Ancef was given for surgical antimicrobial prophylaxis.  The patient was subsequently prepped and draped in the usual sterile fashion.  Timeout was performed.     With the use of ultrasound, the femoral bifucation was identified over the femoral head. Dr Freitas utilized a micropuncture to access the left common femoral artery  with placement of a micro wire and exchanged for a J wire and subsequent 6 Bahamian sheath. I performed this on the right. This was then predilated with 8 Bahamian sheath and then to pro-glide Perclose sutures were placed in the 10 and 2 o'clock position in standard fashion bilaterally with placement of 8 Bahamian sheaths bilaterally. I performed this on the right and Dr. Freitas performed this on the left.     We then utilized a Tierney 4 catheter and 018 Glidewire advantage to engage in select the right renal which was done with careful manipulation by myself.  I confirmed this with contrast injection and then exchanged for a 6 mm balloon and performed angioplasty of the right renal  high-grade stenosis which appeared to resolve without issue with deployment of the balloon.    Dr. Freitas then exchanged for 8 mm balloon and performed angioplasty of the left common and external iliac arteries with 8 mm balloon due to the high-grade stenoses and then exchanged for a 20 Italian sheath that was advanced through the left groin into the infrarenal aorta.      At this point we proceeded with the fenestrated repair. In the left groin Dr. Freitas exchanged for a Lunderquist wire and then performed intravascular ultrasound of the left external iliac, left common iliac, and aorta that was used to determine and verify the locations of the celiac, SMA and right and left renal arteries.    Dr. Freitas then utilized a 20 Italian sheath with the endograft and advanced this through the left iliac into the aorta.        He then unsheathed it in to the visceral aorta. I then selected the graft from the right groin and advanced an 18 Fr sheath up the right groin into the gate and main body of the graft.  I then utilized a Finding Something [id number] catheter to select the SMA, which was confirmed via contrast angiography and placement of a Hylton wire.  I then cannulated the right renal artery with placement of Hylton wire. Dr. Freitas then selected the left renal with placement of a Hylton wire. I then placed a 6Fr sheath in the left renal  with 7x22 iCast and Iplaced one in the right renal with 6x38 iCast. The main body was then deployed by me and then the proximal edge was ballooned with a CODA balloon by Dr. Freitas.  The cannula was removed. He then maintained the balloon for a back board.        Dr Freitas then withdrew the sheath in the left renal partially and then I deployed the left renal stent. This was then post dilated with a 5oso2od cook balloon. Repeat angiogram revealed wide patency no kink or stenosis. The wire and sheath were removed.   I then withdrew the sheath and deployed right right renal stent without issue. I then  flaired this with 9mm balloon. Contrast injection confirmed wide patency with no leak. The wire and sheath were removed. We then placed a 7Fr sheath with 7x22 iCast stent in the SMA. I inflated the balloon and deployed the SMA stent without any issues. Dr. Freitas then flared the proximal segment with a 9 mm balloon without any issues. Angiogram revealed wide patency with no leak past this and thus the wire and sheath were removed.      He then advanced the bifurcated device up the left groin and deployed this below the renal arteries with exposure of the gate. I then used a kumpe and glidewire advantage to the select the gate.     I then exchanged for the intravascular ultrasound in the right common iliac, right external iliac to identify the  iliac bifurcation. I then placed an 11mm right iliac limb and deployed this into the distal right common iliac artery with out issue. He then extended into the left common iliac without issue.   I then utilized a MOAB balloon to balloon the proximal, distal and overlapping zones. I then exchanged for an pigtail cathter and performed an angiogram that revealed wide patency of the celiac, SMA, and renal arteries with wide patency of the iliacs. There was no endoleak at completion with complete seal of the aneurysm.  There was a high-grade stenosis still within the left external iliac and he performed angioplasty and stenting with an 8 mm EV 3 stent and postdilated this with 7 mm balloon.  There was no stenosis within the right.  At this point we were satisfied and opted to terminate the procedure.  All catheters were removed.  The pro-glide Perclose sutures were then cinched with successful closure in the bilateral groins and the sutures were then cinched and cut thereby closing the arteriotomy.  Protamine was used for reversal of heparinization. Dermabond was applied.  Doppler signal was confirmed in the bilateral lower extremities consistent with preoperative baseline.  The  patient awoke from general anesthesia was extubated and taken to the ICU in stable condition.  All counts were correct at the end of the case.        Jethro Monterroso MD

## 2025-02-10 NOTE — ANESTHESIA PROCEDURE NOTES
Peripheral IV  Date/Time: 2/10/2025 8:48 AM  Inserted by: Erickson Hays MD    Placement  Needle size: 18 G  Laterality: left  Location: antecubital  Local anesthetic: none  Site prep: chlorhexidine  Technique: ultrasound guided  Attempts: 1

## 2025-02-10 NOTE — ANESTHESIA PREPROCEDURE EVALUATION
PRE-OP EVALUATION    Patient Name: Finn Carl    Admit Diagnosis: Juxtarenal Abdominal Aortic Aneursym without Rupture    Pre-op Diagnosis: Juxtarenal Abdominal Aortic Aneursym without Rupture    ENDOVASCULAR ABDOMINAL AORTIC ANEURYSM STENT GRAFT REPAIR WITH FENESTRATED DEVICE    Anesthesia Procedure: ENDOVASCULAR ABDOMINAL AORTIC ANEURYSM STENT GRAFT REPAIR WITH FENESTRATED DEVICE    Surgeons and Role:     * Jethro Monterroso MD - Primary     * Dick Freitas MD - Assisting Surgeon    Pre-op vitals reviewed.  Temp: 97.1 °F (36.2 °C)  Pulse: 68  Resp: 16  BP: 166/75  SpO2: 100 %  Body mass index is 20.53 kg/m².    Current medications reviewed.  Hospital Medications:  No current facility-administered medications on file as of 2/10/2025.       Outpatient Medications:   Prescriptions Prior to Admission[1]    Allergies: Duragesic [fentanyl]      Anesthesia Evaluation    Patient summary reviewed.    Anesthetic Complications  (-) history of anesthetic complications         GI/Hepatic/Renal      (+) GERD                           Cardiovascular                       (+) CAD                (+) CHF                Endo/Other               (+) anemia                   Pulmonary        (+) COPD and severe  COPD requiring home oxygen.    (+) shortness of breath            Neuro/Psych                 (+) neuromuscular disease             On home O2 6L NC, takes norco for back pain. Several past surgeries for back pain        Past Surgical History:   Procedure Laterality Date    Appendectomy      Colonoscopy      Epidurography, radiological s & i  3/12/2012    Procedure: TRANSFORAMINAL EPIDURAL - LUMBAR;  Surgeon: Joe Gonsales MD;  Location: [facility] [place] [facility]    Hernia surgery      Hysterectomy      Injection, anesthetic/steroid, transforaminal epidural; lumbar/sacral, single level  2/3/2012    Performed by MALORIE GALICIA at [facility] [place] [facility]    Injection, anesthetic/steroid, transforaminal  epidural; lumbar/sacral, single level  2/21/2012    Procedure: TRANSFORAMINAL EPIDURAL - LUMBAR;  Surgeon: Alex Hartman MD;  Location: [facility]    Injection, w/wo contrast, dx/therapeutic substance, epidural/subarachnoid; cervical/thoracic  3/12/2012    Procedure: TRANSFORAMINAL EPIDURAL - LUMBAR;  Surgeon: Joe Gonsales MD;  Location: [facility]    Injection, w/wo contrast, dx/therapeutic substance, epidural/subarachnoid; cervical/thoracic N/A 3/22/2016    Procedure: CERVICAL EPIDURAL;  Surgeon: Joe Gonsales MD;  Location: [facility]    Ir varicose vein endovenous laser ablation(cpt=36478)  2014    left SSV    Ir varicose vein endovenous laser ablation(cpt=36478) Right 09/2020    Laminectomy      laminectomy    M-sedaj by  phys perfrmg svc 5+ yr  2/3/2012    Performed by MALORIE GALICIA at [facility]    M-sedaj by  phys perfrmg svc 5+ yr  2/21/2012    Procedure: TRANSFORAMINAL EPIDURAL - LUMBAR;  Surgeon: Alex Hartman MD;  Location: [facility]    M-sedaj by  phys perfrmg svc 5+ yr  3/12/2012    Procedure: TRANSFORAMINAL EPIDURAL - LUMBAR;  Surgeon: Joe Gonsales MD;  Location: [facility]    M-sedaj by  phys perfrmg svc 5+ yr N/A 3/22/2016    Procedure: CERVICAL EPIDURAL;  Surgeon: Joe Gonsales MD;  Location: [facility]    Other surgical history      anterior repair of pelvic floor    Patient documented not to have experienced any of the following events N/A 3/22/2016    Procedure: CERVICAL EPIDURAL;  Surgeon: Joe Gonsales MD;  Location: [facility]    Patient withough preoperative order for iv antibiotic surgical site infection prophylaxis. N/A 3/22/2016    Procedure: CERVICAL EPIDURAL;  Surgeon: Joe Gonsales MD;  Location: [facility]    Repair ing hernia,5+y/o,reducibl      Repair shoulder  capsule,anterior      Sclerotherapy Bilateral     Special service or report      removal of colon mass    Spinal fusion      cervical fusion    Tonsillectomy      Total abdom hysterectomy      Vein ligation and stripping Bilateral     about      Social History     Socioeconomic History    Marital status:    Tobacco Use    Smoking status: Former     Current packs/day: 0.00     Types: Cigarettes     Quit date: 3/1/2005     Years since quittin.9    Smokeless tobacco: Never   Vaping Use    Vaping status: Never Used   Substance and Sexual Activity    Alcohol use: No    Drug use: No     Comment: pt did not respond     History   Drug Use No     Comment: pt did not respond     Available pre-op labs reviewed.  Lab Results   Component Value Date    WBC 6.5 12/15/2024    RBC 4.04 12/15/2024    HGB 11.3 (L) 12/15/2024    HCT 35.8 12/15/2024    MCV 88.6 12/15/2024    MCH 28.0 12/15/2024    MCHC 31.6 12/15/2024    RDW 13.9 12/15/2024    .0 12/15/2024     Lab Results   Component Value Date     12/15/2024    K 4.3 12/15/2024     12/15/2024    CO2 30.0 12/15/2024    BUN 19 12/15/2024    CREATSERUM 0.81 12/15/2024    GLU 93 12/15/2024    CA 9.7 12/15/2024     Lab Results   Component Value Date    INR 1.00 02/10/2025         Airway      Mallampati: III  Mouth opening: 3 FB  TM distance: 4 - 6 cm   Cardiovascular             Dental      Dental appliance(s): upper dentures and lower dentures       Pulmonary                     Other findings              ASA: 4   Plan: general  NPO status verified and     Post-procedure pain management plan discussed with surgeon and patient.    Comment: GETA/LMA discussed in detail.  Risk of complications discussed including but not limited to sore throat, cough, PONV discussed. Also, discussed risks including dental injury particularly on any weakened, treated or diseased teeth & pt wishes to proceed  All questions answered.    Plan/risks discussed with:  patient  Use of blood product(s) discussed with: patient    Consented to blood products.          Present on Admission:  **None**             [1]   Medications Prior to Admission   Medication Sig Dispense Refill Last Dose/Taking    HYDROcodone-acetaminophen  MG Oral Tab Take 1 tablet by mouth every 8 (eight) hours as needed for Pain.   Taking As Needed    Formoterol Fumarate 20 MCG/2ML Inhalation Nebu Soln Take 2 mL (20 mcg total) by nebulization every 12 (twelve) hours.   Taking    HYDROcodone-acetaminophen 5-325 MG Oral Tab Take 1 tablet by mouth every 6 (six) hours as needed for Pain.   2/10/2025 at  3:30 AM    Cholecalciferol (VITAMIN D) 25 MCG (1000 UT) Oral Tab Take 1 tablet by mouth daily.   2/2/2025    INCRUSE ELLIPTA 62.5 MCG/INH Inhalation Aerosol Powder, Breath Activated Inhale 1 puff into the lungs daily. 30 each 1 2/10/2025 Morning    FLUOXETINE HCL 40 MG Oral Cap TAKE 1 CAPSULE(40 MG) BY MOUTH DAILY 90 capsule 0 2/9/2025    PROAIR  (90 Base) MCG/ACT Inhalation Aero Soln Inhale 2 puffs into the lungs 4 (four) times daily as needed. 1 Inhaler 12 Taking As Needed    Multiple Vitamins-Minerals (MULTIVITAL) Oral Chew Tab Chew 1 tablet by mouth daily.   2/2/2025    budesonide 0.5 MG/2ML Inhalation Suspension Take 2 mL (0.5 mg total) by nebulization daily. (Patient not taking: Reported on 1/30/2025)   Not Taking    SYMBICORT 160-4.5 MCG/ACT Inhalation Aerosol INL 2 PFS PO BID. RM AFTER EACH U (Patient taking differently: Inhale 2 puffs into the lungs 2 (two) times daily.) 1 Inhaler 5     Calcium Carbonate (CALCIUM 500 OR) Take 2 tablets by mouth daily.   2/2/2025

## 2025-02-10 NOTE — H&P
Vascular Surgery  Progress Note    Name: Finn Carl  MRN: [id number]  : 10/14/1940    Subjective: 84 year old female with history of hypertension, hyperlipidemia, CAD, COPD on oxygen 1 L at rest, 6 L with exertion who presents for follow-up. Her aneurysm has grown and that she presents to discuss surgical repair. She is very afraid of dying from sudden rupture. Her father  of rupture when she was younger. She strongly desires intervention. She denies any worsening chest pain, shortness of breath, palpitation, syncope. She does live alone with her dog and her son who comes over to assist her. Denies any changes since last seen in clinic.    Past Medical History:   Diagnosis Date   ANXIETY   Cervical radiculitis   COPD (chronic obstructive pulmonary disease) (HCC)   Coronary atherosclerosis of unspecified type of vessel, native or graft   H/O degenerative disc disease   HYPERLIPIDEMIA   Irritable bowel syndrome   Lumbar radiculitis   Lumbar spondylosis   Lung collapse 2022   left shandra   MI (myocardial infarction) (HCC)   OSTEOARTHRITIS   OSTEOPOROSIS   Other and unspecified personal history of malignant neoplasm   Pneumonia   Pulmonary hypertension (HCC)   Venous insufficiency     Past Surgical History:   Procedure Laterality Date   APPENDECTOMY   CAPSULORRHAPHY ANTERIOR PUTTI-MAULIK/MIGUEL   COLONOSCOPY   EPIDUROGRAPHY, RADIOLOGICAL S & I 3/12/2012   Procedure: TRANSFORAMINAL EPIDURAL - LUMBAR; Surgeon: Joe Gonsales MD; Location: [facility]   HERNIA SURGERY   HYSTERECTOMY   INJECTION, W/WO CONTRAST, DX/THERAPEUTIC SUBSTANCE, EPIDURAL/SUBARACHNOID; CERVICAL/THORACIC 3/12/2012   Procedure: TRANSFORAMINAL EPIDURAL - LUMBAR; Surgeon: Joe Gonsales MD; Location: [facility]   INJECTION, W/WO CONTRAST, DX/THERAPEUTIC SUBSTANCE, EPIDURAL/SUBARACHNOID; CERVICAL/THORACIC N/A 3/22/2016   Procedure: CERVICAL EPIDURAL; Surgeon: Joe Gonsales MD; Location: [facility]  [facility]   IR VARICOSE VEIN ENDOVENOUS LASER ABLATION(CPT=36478) 2014   left SSV   IR VARICOSE VEIN ENDOVENOUS LASER ABLATION(CPT=36478) Right 09/2020   LAMINECTOMY   laminectomy   M-SEDAJ BY  PHYS PERFRMG SVC 5+ YR 2/3/2012   Performed by MALORIE GALICIA at [facility]   M-SEDAJ BY  PHYS PERFRMG SVC 5+ YR 2/21/2012   Procedure: TRANSFORAMINAL EPIDURAL - LUMBAR; Surgeon: Alex Hartman MD; Location: [facility]   M-SEDAJ BY  PHYS PERFRMG SVC 5+ YR 3/12/2012   Procedure: TRANSFORAMINAL EPIDURAL - LUMBAR; Surgeon: Joe Gonsales MD; Location: [facility]   M-SEDAJ BY  PHYS PERFRMG SVC 5+ YR N/A 3/22/2016   Procedure: CERVICAL EPIDURAL; Surgeon: Joe Gonsales MD; Location: [facility]   NJX AA&/STRD TFRML EPI LUMBAR/SACRAL 1 LEVEL 2/3/2012   Performed by MALORIE GALICIA at [facility]   NJX AA&/STRD TFRML EPI LUMBAR/SACRAL 1 LEVEL 2/21/2012   Procedure: TRANSFORAMINAL EPIDURAL - LUMBAR; Surgeon: Alex Hartman MD; Location: [facility]   OTHER SURGICAL HISTORY   anterior repair of pelvic floor   PATIENT DOCUMENTED NOT TO HAVE EXPERIENCED ANY OF THE FOLLOWING EVENTS N/A 3/22/2016   Procedure: CERVICAL EPIDURAL; Surgeon: Joe Gonsales MD; Location: [facility]   PATIENT WITHOUGH PREOPERATIVE ORDER FOR IV ANTIBIOTIC SURGICAL SITE INFECTION PROPHYLAXIS. N/A 3/22/2016   Procedure: CERVICAL EPIDURAL; Surgeon: Joe Gonsales MD; Location: [facility]   RPR 1ST INGUN HRNA AGE 5 YRS/> REDUCIBLE   SCLEROTHERAPY Bilateral 2014   SPINAL FUSION   cervical fusion   TONSILLECTOMY   TOTAL ABDOMINAL HYSTERECT W/WO RMVL TUBE OVARY   UNLISTED SPECIAL SERVICE PROCEDURE/REPORT   removal of colon mass   VEIN LIGATION AND STRIPPING Bilateral   about 1980     Current Outpatient Medications:   HYDROcodone-acetaminophen (NORCO)  MG Oral Tab, Take 1 tablet by  mouth 3 (three) times daily. for 30 days, Disp: 90 tablet, Rfl: 0  tobramycin-dexamethasone 0.3-0.1 % Ophthalmic Suspension, SHAKE BOTTLE AND INSTILL 1 DROP INTO LEFT EYE FOUR TIMES DAILY, Disp: , Rfl:   budesonide 0.5 MG/2ML Inhalation Suspension, Take 2 mL (0.5 mg total) by nebulization 2 (two) times daily., Disp: 120 mL, Rfl: 5  Formoterol Fumarate 20 MCG/2ML Inhalation Nebu Soln, Take 2 mL (20 mcg total) by nebulization every 12 (twelve) hours., Disp: 120 mL, Rfl: 5  PROAIR  (90 Base) MCG/ACT Inhalation Aero Soln, Inhale 2 puffs into the lungs every 4 to 6 hours as needed., Disp: 1 each, Rfl: 2  SYMBICORT 160-4.5 MCG/ACT Inhalation Aerosol, INHALE 2 PUFFS BY MOUTH TWICE DAILY. RINSE MOUTH AFTER EACH USE, Disp: 10.2 g, Rfl: 0  FLUOXETINE HCL 40 MG Oral Cap, TAKE 1 CAPSULE(40 MG) BY MOUTH DAILY, Disp: 90 capsule, Rfl: 0  HYDROcodone-acetaminophen (NORCO) 5-325 MG Oral Tab, Take 1 tablet by mouth 4 (four) times daily. For 30 days, Disp: 120 tablet, Rfl: 0  HYDROcodone-acetaminophen (NORCO) 5-325 MG Oral Tab, Take 1 tablet by mouth 4 (four) times daily. For 30 days, Disp: 120 tablet, Rfl: 0  HYDROcodone-acetaminophen (NORCO) 5-325 MG Oral Tab, Take 1 tablet by mouth 4 (four) times daily. For 30 days, Disp: 120 tablet, Rfl: 0  Umeclidinium Bromide (INCRUSE ELLIPTA) 62.5 MCG/ACT Inhalation Aerosol Powder, Breath Activated, Inhale 1 puff into the lungs daily., Disp: 30 each, Rfl: 5  HYDROcodone-acetaminophen (NORCO) 5-325 MG Oral Tab, Take 1 tablet by mouth 4 (four) times daily as needed for Pain. for 30 days, Disp: 120 tablet, Rfl: 0  HYDROcodone-acetaminophen (NORCO) 5-325 MG Oral Tab, Take 1 tablet by mouth 4 (four) times daily as needed for Pain. for 30 days, Disp: 90 tablet, Rfl: 0  Cholecalciferol 25 MCG (1000 UT) Oral Tab, Take by mouth daily., Disp: , Rfl:   Multiple Vitamins-Minerals (MULTIVITAL) Oral Chew Tab, Chew 1 tablet by mouth daily., Disp: , Rfl:   Calcium Carbonate (CALCIUM 500 OR), Take 2  tablets by mouth daily., Disp: , Rfl:     Objective:   Physical Examination  01/07/25  1209   Weight: 120 lb (54.4 kg)   Height: 5' 4\" (1.626 m)     Skin no rashes or skin lesions identified  Neck supple; no LAD; trachea midline   RRR  CTAB; breathing comfortably  Abd soft, NT, ND; no palpable abdominal masses.  RUE: Palpable radial and ulnar pulse  LUE: Palpable radial and ulnar pulse  RLE: Palp Fem, Palp DP, Palp PT   LLE: Palp Fem, Palp DP, Palp PT   Neurologic: CN II-XII grossly intact  5/5 sensorimotor function in the bilateral upper and lower extremities.    Labs:  Lab Results   Component Value Date   WBC 7.38 08/21/2024   HGB 11.6 (L) 08/21/2024   HCT 38.0 08/21/2024    08/21/2024   NEPERCENT 72.9 08/21/2024   LYPERCENT 16.3 08/21/2024   MOPERCENT 7.7 08/21/2024   EOPERCENT 2.3 08/21/2024   BAPERCENT 0.8 08/21/2024   NE 5.38 08/21/2024   LYMABS 1.20 08/21/2024   MOABSO 0.57 08/21/2024   EOABSO 0.17 08/21/2024   BAABSO 0.06 08/21/2024     Imaging: CTA: Imaging reviewed. See final report for further details. Aneurysm measures 5.1 cm in maximum diameter. This has grown 3 to 4 mm throughout. There is no mural thrombus and the aneurysm is completely opacified with contrast.    Assessment and Plan:  84 year old female with juxtarenal aortic aneurysm. I informed her that her aneurysm meets size criteria for repair. I am more concerned based on her small stature and size and the size of the aneurysm that it is at high risk for rupture. Especially given its morphology with no mural thrombus. She is overall of strong functional status despite her emphysema. I would recommend her for endovascular repair as she is prohibitive for open surgery. Based on the aneurysm extent she would require a fenestrated endovascular aortic repair with the Zenith fenestrated device. I discussed the risk and benefits of the procedure not limited to the risk of bleeding, infection, rupture, death. She affirms her understanding and  strongly desires intervention. Consent obtained. Proceed to OR as above.    Thank you for allowing me to participate in this patient's care.    Jethro Monterroso MD  [facility]  Vascular Surgery

## 2025-02-10 NOTE — ANESTHESIA PROCEDURE NOTES
Arterial Line    Date/Time: 2/10/2025 8:20 AM    Performed by: Erickson Hays MD  Authorized by: Erickson Hays MD    General Information and Staff    Procedure Start:  2/10/2025 8:20 AM  Procedure End:  2/10/2025 8:20 AM  Anesthesiologist:  Erickson Hays MD  Performed By:  Anesthesiologist  Patient Location:  OR  Indication: continuous blood pressure monitoring and blood sampling needed    Site Identification: real time ultrasound guided and image stored and retrievable    Preanesthetic Checklist: 2 patient identifiers, IV checked, risks and benefits discussed, monitors and equipment checked, pre-op evaluation, timeout performed, anesthesia consent and sterile technique used    Procedure Details    Catheter Size:  20 G  Catheter Length:  1 and 3/4 inch  Catheter Type:  Arrow  Seldinger Technique?: Yes    Laterality:  Left  Site:  Radial artery  Site Prep: chlorhexidine    Line Secured:  Tape and Tegaderm    Assessment    Events: patient tolerated procedure well with no complications      Medications  2/10/2025 8:20 AM      Additional Comments

## 2025-02-10 NOTE — CONSULTS
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 Monterroso.  Pt tolerated the surgery without immediate complications.  EBL: 200mL.  Pt extubated post operatively and brought to the ICU for close monitoring.  Currently pt complains of some mild SOB.  No cough.         Past Medical History:    ANXIETY    Aortic aneurysm    Cervical radiculitis    COPD (chronic obstructive pulmonary disease) (HCC)    Coronary atherosclerosis of unspecified type of vessel, native or graft    H/O degenerative disc disease    HYPERLIPIDEMIA    Irritable bowel syndrome    Lumbar radiculitis    Lumbar spondylosis    OSTEOARTHRITIS    OSTEOPOROSIS    Other and unspecified personal history of malignant neoplasm    Pulmonary hypertension (HCC)    Venous insufficiency      Past Surgical History:   Procedure Laterality Date    Appendectomy      Colonoscopy      Epidurography, radiological s & i  3/12/2012    Procedure: TRANSFORAMINAL EPIDURAL - LUMBAR;  Surgeon: Joe Gonsales MD;  Location: [facility]    Hernia surgery      Hysterectomy      Injection, anesthetic/steroid, transforaminal epidural; lumbar/sacral, single level  2/3/2012    Performed by MALORIE GALICIA at [facility]    Injection, anesthetic/steroid, transforaminal epidural;  lumbar/sacral, single level  2/21/2012    Procedure: TRANSFORAMINAL EPIDURAL - LUMBAR;  Surgeon: Alex Hartman MD;  Location: [facility]    Injection, w/wo contrast, dx/therapeutic substance, epidural/subarachnoid; cervical/thoracic  3/12/2012    Procedure: TRANSFORAMINAL EPIDURAL - LUMBAR;  Surgeon: Joe Gonsales MD;  Location: [facility]    Injection, w/wo contrast, dx/therapeutic substance, epidural/subarachnoid; cervical/thoracic N/A 3/22/2016    Procedure: CERVICAL EPIDURAL;  Surgeon: Joe Gonsales MD;  Location: [facility]    Ir varicose vein endovenous laser ablation(cpt=36478)  2014    left SSV    Ir varicose vein endovenous laser ablation(cpt=36478) Right 09/2020    Laminectomy      laminectomy    M-sedaj by  phys perfrmg svc 5+ yr  2/3/2012    Performed by MALORIE GALICIA at [facility]    M-sedaj by  phys perfrmg svc 5+ yr  2/21/2012    Procedure: TRANSFORAMINAL EPIDURAL - LUMBAR;  Surgeon: Alex Hartman MD;  Location: [facility]    M-sedaj by  phys perfrmg svc 5+ yr  3/12/2012    Procedure: TRANSFORAMINAL EPIDURAL - LUMBAR;  Surgeon: Joe Gonsales MD;  Location: [facility]    M-sedaj by  phys perfrmg svc 5+ yr N/A 3/22/2016    Procedure: CERVICAL EPIDURAL;  Surgeon: Joe Gonsales MD;  Location: [facility]    Other surgical history      anterior repair of pelvic floor    Patient documented not to have experienced any of the following events N/A 3/22/2016    Procedure: CERVICAL EPIDURAL;  Surgeon: Joe Gonsales MD;  Location: [facility]    Patient withough preoperative order for iv antibiotic surgical site infection prophylaxis. N/A 3/22/2016    Procedure: CERVICAL EPIDURAL;  Surgeon: Joe Gonsales MD;  Location: [facility]    Repair ing hernia,5+y/o,reducibl      Repair shoulder capsule,anterior       Sclerotherapy Bilateral 2014    Special service or report      removal of colon mass    Spinal fusion      cervical fusion    Tonsillectomy      Total abdom hysterectomy      Vein ligation and stripping Bilateral     about 1980      Allergies[1]     Social History:   reports that she quit smoking about 19 years ago. Her smoking use included cigarettes. She has never used smokeless tobacco. She reports that she does not drink alcohol and does not use drugs.      Family History:  Family History   Problem Relation Age of Onset    Heart Disorder Father          Home Medications:  Medications Taking[2]     Current Medications:    Current Facility-Administered Medications:     lactated ringers infusion, , Intravenous, Continuous    lactated ringers IV bolus 500 mL, 500 mL, Intravenous, Once PRN    atropine 0.1 MG/ML injection 0.5 mg, 0.5 mg, Intravenous, PRN    naloxone (Narcan) 0.4 MG/ML injection 0.08 mg, 0.08 mg, Intravenous, PRN    fentaNYL (Sublimaze) 50 mcg/mL injection 25 mcg, 25 mcg, Intravenous, Q5 Min PRN **OR** fentaNYL (Sublimaze) 50 mcg/mL injection 50 mcg, 50 mcg, Intravenous, Q5 Min PRN    HYDROmorphone (Dilaudid) 1 MG/ML injection 0.2 mg, 0.2 mg, Intravenous, Q5 Min PRN **OR** HYDROmorphone (Dilaudid) 1 MG/ML injection 0.4 mg, 0.4 mg, Intravenous, Q5 Min PRN **OR** HYDROmorphone (Dilaudid) 1 MG/ML injection 0.6 mg, 0.6 mg, Intravenous, Q5 Min PRN    albuterol (Ventolin HFA) 108 (90 Base) MCG/ACT inhaler 2 puff, 2 puff, Inhalation, QID PRN    FLUoxetine (PROzac) cap 40 mg, 40 mg, Oral, Daily    [START ON 2/11/2025] umeclidinium bromide (Incruse Ellipta) 62.5 MCG/ACT inhaler 1 puff, 1 puff, Inhalation, Daily    fluticasone-salmeterol (Advair Diskus) 250-50 MCG/ACT inhaler 1 puff, 1 puff, Inhalation, BID    acetaminophen (Tylenol) tab 650 mg, 650 mg, Oral, Q4H PRN **OR** HYDROcodone-acetaminophen (Norco) 5-325 MG per tab 1 tablet, 1 tablet, Oral, Q4H PRN **OR** HYDROcodone-acetaminophen (Norco) 5-325 MG per  tab 2 tablet, 2 tablet, Oral, Q4H PRN    ondansetron (Zofran) 4 MG/2ML injection 4 mg, 4 mg, Intravenous, Q6H PRN    metoclopramide (Reglan) 5 mg/mL injection 5 mg, 5 mg, Intravenous, Q8H PRN    lactated ringers infusion, , Intravenous, Continuous    heparin (Porcine) 5000 UNIT/ML injection 5,000 Units, 5,000 Units, Subcutaneous, Q8H ANNE    ketorolac (Toradol) 15 MG/ML injection 15 mg, 15 mg, Intravenous, 4 times per day    lidocaine-menthol 4-1 % patch 1 patch, 1 patch, Transdermal, Daily     Review of Systems:  Constitutional: denies weight loss, fevers, chills, night sweats, or fatigue  HEENT: denies vision or hearing changes, eye pain, tinnitus, hearing loss, sore throat, epistaxis, sinus congestion  Cardiovascular: denies chest pain, PND, palpitations, edema, orthopnea, or syncope  Respiratory: +mild SOB, denies cough, hemoptysis, wheezing, pleurisy  GI: denies nausea, vomiting, diarrhea, constipation, melena, abdominal pain  : denies hematuria, dysuria, hesitancy, or incontinence  Musculoskeletal: denies arthralgias, myalgias, muscle weakness, or joint swelling  Skin: denies rash or pruritis; no jaundice  Neurologic: denies numbness, weakness, ataxia, tremors, or vertigo  Psychiatric: denies insomnia, depression, anxiety, or drug abuse  Endocrine: denies polydypsia, polyuria, cold/heat intolerance  Hematologic/lymphatic: denies easy bruising, new blood clots, or lymphedema  Allergic/immunologic: denies hay fever, hives, or new allergies       OBJECTIVE:  Patient Vitals for the past 24 hrs:   BP Temp Temp src Pulse Resp SpO2 Weight   02/10/25 1200 -- 98.1 °F (36.7 °C) Temporal 69 12 92 % --   02/10/25 1130 (!) 183/67 98.9 °F (37.2 °C) Temporal 70 10 98 % --   02/10/25 0700 (!) 166/75 97.1 °F (36.2 °C) Temporal 68 16 100 % 119 lb 9.6 oz (54.3 kg)     O2 requirement: 6L  Wt Readings from Last 3 Encounters:   02/10/25 119 lb 9.6 oz (54.3 kg)   12/15/24 120 lb (54.4 kg)   08/08/24 123 lb (55.8 kg)        No  intake/output data recorded.  No intake/output data recorded.     Physical Exam:                          General: alert, cooperative, in NAD                          HEENT: oropharynx clear without erythema or exudates, moist mucous membranes                          Lungs: diminished BS bilaterally                          Chest wall: No tenderness or deformity.                          Heart: Regular rate and rhythm, normal S1S2,                          Abdomen: soft, non-tender, non-distended, positive BS.                          Extremity: No clubbing or cyanosis. no edema                          Skin: No rashes or lesions.                Lab Results   Component Value Date    INR 1.00 02/10/2025    INR 1.04 10/04/2023     Lab Results   Component Value Date    IPH 7.32 02/10/2025    IPCO2 46.1 02/10/2025    IPO2 298 02/10/2025    ITCO2 25 02/10/2025    IHCO3 23.9 02/10/2025    ISO2 100 02/10/2025    CHLOE -2.0 02/10/2025       Imaging: I have independently visualized all relevant chest imaging in PACS.  I agree with the radiology interpretation except where noted.  PFT 2/2024. FVC 2.67 L (117%) , FEV1 1.11 L (64%) , ratio 42% , %, % , DLCO 22% , DLCO/VA 23%    6MWT 11/2023: 1 L at rest, 6 L with activity      ASSESSMENT/PLAN:  Acute on chronic hypoxemic respiratory failure: suspect atelectasis in the post operative period  -per last SST needs 1L at rest and 6L with activity, pt states that she has been using 6L all the time - currently on 6L at rest  -check CXR  -encourage IS   -wean O2 as able  COPD/emphysema: no evidence of exacerbation  -OP PFTs with mild obstruction with severe decrease in diffusion capacity  -advair/incruse for home trelegy  -BD protocol  -previously worked up for endobronchial valves as OP, deemed not a candidate  Pulm HTN: RVSP: 39mmHg on echo in 2/2023  -maintain euvolemia   Enlarging abdominal aortic aneurysm  -s/p endovascular AAA stent graft repair with fenestrated  device 2/10 with Dr. Monterroso  -BP management per vascular surgery   -pain control  Dispo:  -DNAR/select  -will follow   -discussed with family at bedside     Milye Jacques MD  2/10/2025  1:19 PM            [1]   Allergies  Allergen Reactions    Duragesic [Fentanyl] ITCHING     Had rxn to adhesive at one point. Patient denies any reaction to Fentanyl, only to the adhesive   [2]   Outpatient Medications Marked as Taking for the 2/10/25 encounter (Hospital Encounter)   Medication Sig Dispense Refill    HYDROcodone-acetaminophen  MG Oral Tab Take 1 tablet by mouth every 8 (eight) hours as needed for Pain.      HYDROcodone-acetaminophen 5-325 MG Oral Tab Take 1 tablet by mouth every 6 (six) hours as needed for Pain.      Cholecalciferol (VITAMIN D) 25 MCG (1000 UT) Oral Tab Take 1 tablet by mouth daily.      INCRUSE ELLIPTA 62.5 MCG/INH Inhalation Aerosol Powder, Breath Activated Inhale 1 puff into the lungs daily. 30 each 1    FLUOXETINE HCL 40 MG Oral Cap TAKE 1 CAPSULE(40 MG) BY MOUTH DAILY 90 capsule 0    PROAIR  (90 Base) MCG/ACT Inhalation Aero Soln Inhale 2 puffs into the lungs 4 (four) times daily as needed. 1 Inhaler 12    Multiple Vitamins-Minerals (MULTIVITAL) Oral Chew Tab Chew 1 tablet by mouth daily.

## 2025-02-10 NOTE — ANESTHESIA POSTPROCEDURE EVALUATION
[facility]    Finn Carl Patient Status:  Inpatient   Age/Gender 84 year old female MRN [id number]   Location [facility] 4SW-A Attending Jethro Monterroso MD   Hosp Day # 0 PCP Gisele Faulkner MD       Anesthesia Post-op Note    ENDOVASCULAR ABDOMINAL AORTIC ANEURYSM STENT GRAFT REPAIR WITH FENESTRATED DEVICE.FOR MORE INFO SEE CATH LAB CHARTING    Procedure Summary       Date: 02/10/25 Room / Location:  CV[facility] 03 HYBRID /  CVOR    Anesthesia Start: 0804 Anesthesia Stop: 1131    Procedure: ENDOVASCULAR ABDOMINAL AORTIC ANEURYSM STENT GRAFT REPAIR WITH FENESTRATED DEVICE.FOR MORE INFO SEE CATH LAB CHARTING Diagnosis: (Juxtarenal Abdominal Aortic Aneursym without Rupture)    Surgeons: Jethro Monterroso MD Anesthesiologist: Erickson Hays MD    Anesthesia Type: general ASA Status: 4            Anesthesia Type: general    Vitals Value Taken Time   /72 02/10/25 1132   Temp 97 02/10/25 1132   Pulse 85 02/10/25 1132   Resp 19 02/10/25 1132   SpO2 98 02/10/25 1132           Patient Location: ICU    Anesthesia Type: general    Airway Patency: patent    Postop Pain Control: adequate    Mental Status: mildly sedated but able to meaningfully participate in the post-anesthesia evaluation    Nausea/Vomiting: none    Cardiopulmonary/Hydration status: stable euvolemic    Complications: no apparent anesthesia related complications    Postop vital signs: stable    Dental Exam: Unchanged from Preop    Sign out given to ICU staff.

## 2025-02-10 NOTE — CONSULTS
ICU  Critical Care APRN Consult Note    NAME: Finn Carl - ROOM: 465/465-A - MRN: [id number] - Age: 84 year old - :10/14/1940    History Of Present Illness:  Finn Carl is a 84 year old female with PMHx significant for AAA, COPD on 1-6 L O2 at baseline depending on exertion, cervical radiculitis, CAD, HTN, hyperlipidemia, MI, chronic diastolic heart failure, degenerative disc disease, lumbar radiculitis and spondylosis, osteoarthritis, osteoporosis, pneumonia, pulmonary hypertension, venous insufficiency, anxiety, unipolar affective disorder.     Outpatient, pt follows with cardiology, pulmonology, pain management, and vascular.     Pt presents to the ICU s/p elective endovascular AAA stent graft repair with fenestrated device. Procedure was successful and uncomplicated; no endoleak,  ml. Pt arrives in the ICU on 2.5 mg of Nicardipine, 6L O2. She is awake, alert, and oriented. PIV, a-line, finch catheter in place. C/o 8-10/10 pain predominantly in her back. Pt reports that she lives at home with her son and is independent.     PMH:  Past Medical History:    ANXIETY    Aortic aneurysm    Cervical radiculitis    COPD (chronic obstructive pulmonary disease) (HCC)    Coronary atherosclerosis of unspecified type of vessel, native or graft    H/O degenerative disc disease    HYPERLIPIDEMIA    Irritable bowel syndrome    Lumbar radiculitis    Lumbar spondylosis    OSTEOARTHRITIS    OSTEOPOROSIS    Other and unspecified personal history of malignant neoplasm    Pulmonary hypertension (HCC)    Venous insufficiency       Social Hx:  Social History     Socioeconomic History    Marital status:    Tobacco Use    Smoking status: Former     Current packs/day: 0.00     Types: Cigarettes     Quit date: 3/1/2005     Years since quittin.9    Smokeless tobacco: Never   Vaping Use    Vaping status: Never Used   Substance and Sexual Activity    Alcohol use: No    Drug use: No     Comment: pt did not respond      Social Drivers of Health     Food Insecurity: No Food Insecurity (10/4/2023)    Food Insecurity     Food Insecurity: Never true   Transportation Needs: No Transportation Needs (10/4/2023)    Transportation Needs     Lack of Transportation: No   Housing Stability: Low Risk  (10/4/2023)    Housing Stability     Housing Instability: No       Family Hx:  Family History   Problem Relation Age of Onset    Heart Disorder Father          Review of Systems:   A comprehensive 10 point review of systems was completed.  Pertinent positives and negatives noted in the HPI.    Current Facility-Administered Medications   Medication Dose Route Frequency    lactated ringers infusion   Intravenous Continuous    lactated ringers IV bolus 500 mL  500 mL Intravenous Once PRN    atropine 0.1 MG/ML injection 0.5 mg  0.5 mg Intravenous PRN    naloxone (Narcan) 0.4 MG/ML injection 0.08 mg  0.08 mg Intravenous PRN    fentaNYL (Sublimaze) 50 mcg/mL injection 25 mcg  25 mcg Intravenous Q5 Min PRN    Or    fentaNYL (Sublimaze) 50 mcg/mL injection 50 mcg  50 mcg Intravenous Q5 Min PRN    HYDROmorphone (Dilaudid) 1 MG/ML injection 0.2 mg  0.2 mg Intravenous Q5 Min PRN    Or    HYDROmorphone (Dilaudid) 1 MG/ML injection 0.4 mg  0.4 mg Intravenous Q5 Min PRN    Or    HYDROmorphone (Dilaudid) 1 MG/ML injection 0.6 mg  0.6 mg Intravenous Q5 Min PRN    albuterol (Ventolin HFA) 108 (90 Base) MCG/ACT inhaler 2 puff  2 puff Inhalation QID PRN    FLUoxetine (PROzac) cap 40 mg  40 mg Oral Daily    [START ON 2/11/2025] umeclidinium bromide (Incruse Ellipta) 62.5 MCG/ACT inhaler 1 puff  1 puff Inhalation Daily    fluticasone-salmeterol (Advair Diskus) 250-50 MCG/ACT inhaler 1 puff  1 puff Inhalation BID    acetaminophen (Tylenol) tab 650 mg  650 mg Oral Q4H PRN    Or    HYDROcodone-acetaminophen (Norco) 5-325 MG per tab 1 tablet  1 tablet Oral Q4H PRN    Or    HYDROcodone-acetaminophen (Norco) 5-325 MG per tab 2 tablet  2 tablet Oral Q4H PRN     ondansetron (Zofran) 4 MG/2ML injection 4 mg  4 mg Intravenous Q6H PRN    metoclopramide (Reglan) 5 mg/mL injection 5 mg  5 mg Intravenous Q8H PRN    lactated ringers infusion   Intravenous Continuous    heparin (Porcine) 5000 UNIT/ML injection 5,000 Units  5,000 Units Subcutaneous Q8H ANNE    ketorolac (Toradol) 15 MG/ML injection 15 mg  15 mg Intravenous 4 times per day    lidocaine-menthol 4-1 % patch 1 patch  1 patch Transdermal Daily       OBJECTIVE  Vitals:  BP (!) 183/67 (BP Location: Radial)   Pulse 70   Temp 98.9 °F (37.2 °C) (Temporal)   Resp 10   Ht 162.6 cm (5' 4\")   Wt 119 lb 9.6 oz (54.3 kg)   SpO2 98%   BMI 20.53 kg/m²              Physical Exam:      General: 8-10/10 pain, otherwise no apparent distress  Neuro: AO x3, non focal   Lungs: Clear, on 6LO2  Cardio:  RRR  Abdomen: Soft, non tender, non distended, gauze dressings bilateral endovascular sites  Extremities: Warm, no swelling     Data this admission:  No results found.    Labs:  Lab Results   Component Value Date    PTT 33.2 02/10/2025    INR 1.00 02/10/2025       No results found.         Assessment/Plan:    AAA s/p endovascular repair   -post op labs pending: CBC, CMP, Mag, Phos   -PRN pain management: scheduled Toradol, lidocaine patch daily, norco PRN, tylenol PRN   -BP management per vascular surgery   -Vascular surgery following    Hypertension   -Nicardipine, parameters per vascular surgery   -no PTA medications   -will consult cardiology    Hx of hyperlipidemia   -no PTA medications   -last lipid panel 8/21/24   -will consult cardiology    Hx of chronic diastolic heart failure   -no PTA medications   -last echo 2/2023, EF 55-60%, diastolic dysfunction, mild MR, moderate TR,  RVSP 39    -will consult cardiology    Hx of CAD/MI   -no PTA medications   -last stress test 4/2023, normal nuclear perfusion, no evidence of myocardial ischemia or infarction   -will consult cardiology    Hx of COPD   -6 L O2, titrate to keep sats  >88%   -albuterol inhaler QID PRN   -PTA meds include incruse ellipta inhaler, symbicort inhaler, proair inhaler   -will consult pulmonology    Hx of degenerative disc disease, lumbar radiculitis and spondylosis, osteoarthritis, osteoporosis   -toradol scheduled q6h   -Lidocaine patch daily  -norco and tylenol PRN   -PTA meds include norco 5 and norco 10 PRN    Hx of anxiety, unipolar affective disorder   -scheduled pta med prozac    F/E/N   -lactated ringers   -replace electrolytes per protocol   -ADAT    Ppx   -heparin subcutaneous   -SCD's    Lines/drains   -PIV   -arterial line   -finch catheter      Dispo:   - DNR  - ICU monitoring      ICU checklist  Feeding: ADAT  Analgesia: toradol, norco, lidocaine, tylenol  Sedation: N/A  Thromboembolism PPX: heparin, SCD's  Stress Ulcer PPX: N/A  Glucose control: N/A  Bowel Regimen: N/A  Lines/drains/tubes: PIV, a-line, finch  Deescalation of antibiotics: N/A  Therapies:PT/OT/ST when appropriate  Dispo: DNR    Code Status: DNAR/Selective Treatment    Plan of care discussed with intensivist, Dr. Twin Schuster  [facility]      Addendum    I agree with critical care APN note above. I have independently seen & evaluated the patient.  I agree with the management plan outlined above with the above changes/additions.    Pt is doing well.  She notes some heartburn symptoms which she gets occasionally.  She feels her breathing is stable and denies issues related to her surgery.  Will continue to monitor in ICU      Eduardo Murcia  Pulmonary And Critical Care  On call Intensivist 02/10/25

## 2025-02-10 NOTE — BRIEF OP NOTE
Finn Carl  [id number]  10/14/1940     Date: 02/10/2025    Pre-Operative Diagnosis:   Juxtarenal Abdominal Aortic Aneursym without Rupture  2. Right renal artery stenosis  3. Left iliac stenosis    Post-Operative Diagnosis:   Juxtarenal Abdominal Aortic Aneursym without Rupture  2. Right renal artery stenosis  3. Left iliac stenosis      Procedure Performed:   US guided access of bilateral common femoral arteries.  Aortogram and pelvic angiogram with radiologic supervision and interpretation.  Right renal artery angioplasty of renal artery stenosis with 6mm balloon.  Fenestrated endovascular aortic repair with 3 fenestrations (CPT 46273)  Main body left ipsilateral 24 x 109  12 x 20 x 76 bifurcated left ipsilateral  11 x 74 right contralateral  13 x 56 left ipsilateral extension  Intravascular sound of the right external iliac, right common iliac, left external iliac, left common iliac, and aorta with radiologic supervision and interpretation  Left external iliac artery angioplasty and stenting with placement of 8 mm bare-metal stent postdilated with 7 mm balloon  Closure of bilateral groin with Pro-glide Perclose suture (18 Gambian right, 20 Gambian left)      Co-Surgeons:     * Jethro Monterroso MD       * Dick Freitas MD       Surgical Findings: No endoleak at completion.  Wide patency of all vessels at completion.  Palpable DP bilaterally.         Estimated Blood Loss: 200cc       Jethro Monterroso MD  2/10/2025  11:05 AM

## 2025-02-11 ENCOUNTER — APPOINTMENT (OUTPATIENT)
Dept: CT IMAGING | Facility: HOSPITAL | Age: 85
End: 2025-02-11
Attending: REGISTERED NURSE
Payer: MEDICARE

## 2025-02-11 ENCOUNTER — APPOINTMENT (OUTPATIENT)
Dept: GENERAL RADIOLOGY | Facility: HOSPITAL | Age: 85
End: 2025-02-11
Attending: REGISTERED NURSE
Payer: MEDICARE

## 2025-02-11 LAB
ANION GAP SERPL CALC-SCNC: 10 MMOL/L (ref 0–18)
APTT PPP: 29.3 SECONDS (ref 23–36)
ATRIAL RATE: 104 BPM
ATRIAL RATE: 105 BPM
BUN BLD-MCNC: 15 MG/DL (ref 9–23)
CALCIUM BLD-MCNC: 9.5 MG/DL (ref 8.7–10.6)
CHLORIDE SERPL-SCNC: 104 MMOL/L (ref 98–112)
CK SERPL-CCNC: 49 U/L
CK SERPL-CCNC: 54 U/L
CO2 SERPL-SCNC: 26 MMOL/L (ref 21–32)
CREAT BLD-MCNC: 0.7 MG/DL
EGFRCR SERPLBLD CKD-EPI 2021: 86 ML/MIN/1.73M2 (ref 60–?)
ERYTHROCYTE [DISTWIDTH] IN BLOOD BY AUTOMATED COUNT: 14.1 %
GLUCOSE BLD-MCNC: 153 MG/DL (ref 70–99)
HCT VFR BLD AUTO: 32.4 %
HGB BLD-MCNC: 10.6 G/DL
INR BLD: 1.04 (ref 0.8–1.2)
L PNEUMO AG UR QL: NEGATIVE
MCH RBC QN AUTO: 27.8 PG (ref 26–34)
MCHC RBC AUTO-ENTMCNC: 32.7 G/DL (ref 31–37)
MCV RBC AUTO: 85 FL
OSMOLALITY SERPL CALC.SUM OF ELEC: 294 MOSM/KG (ref 275–295)
P AXIS: 29 DEGREES
P AXIS: 35 DEGREES
P-R INTERVAL: 126 MS
P-R INTERVAL: 140 MS
PLATELET # BLD AUTO: 174 10(3)UL (ref 150–450)
POTASSIUM SERPL-SCNC: 3.7 MMOL/L (ref 3.5–5.1)
PROTHROMBIN TIME: 13.8 SECONDS (ref 11.6–14.8)
Q-T INTERVAL: 348 MS
Q-T INTERVAL: 352 MS
QRS DURATION: 84 MS
QRS DURATION: 90 MS
QTC CALCULATION (BEZET): 459 MS
QTC CALCULATION (BEZET): 462 MS
R AXIS: 45 DEGREES
R AXIS: 56 DEGREES
RBC # BLD AUTO: 3.81 X10(6)UL
SODIUM SERPL-SCNC: 140 MMOL/L (ref 136–145)
STREP PNEUMO ANTIGEN, URINE: NEGATIVE
T AXIS: 14 DEGREES
T AXIS: 22 DEGREES
TROPONIN I SERPL HS-MCNC: 4 NG/L
TROPONIN I SERPL HS-MCNC: 5 NG/L
VENTRICULAR RATE: 104 BPM
VENTRICULAR RATE: 105 BPM
WBC # BLD AUTO: 13.6 X10(3) UL (ref 4–11)

## 2025-02-11 PROCEDURE — 99233 SBSQ HOSP IP/OBS HIGH 50: CPT | Performed by: INTERNAL MEDICINE

## 2025-02-11 PROCEDURE — 71045 X-RAY EXAM CHEST 1 VIEW: CPT | Performed by: REGISTERED NURSE

## 2025-02-11 PROCEDURE — 74174 CTA ABD&PLVS W/CONTRAST: CPT | Performed by: REGISTERED NURSE

## 2025-02-11 RX ORDER — POTASSIUM CHLORIDE 1500 MG/1
40 TABLET, EXTENDED RELEASE ORAL ONCE
Status: COMPLETED | OUTPATIENT
Start: 2025-02-11 | End: 2025-02-11

## 2025-02-11 NOTE — PROGRESS NOTES
[facility]   part of [facility]     Vascular Surgery Progress Note    Finn Carl Patient Status:  Inpatient    10/14/1940 MRN [id number]   Location [facility] 4SW-A Attending Jethro Monterroso MD   Hosp Day # 1 PCP Gisele Faulkner MD     Objective:   Temp: 97.9 °F (36.6 °C)  Pulse: 92  Resp: 18  BP: 183/67  AO: 153/60      Events Yesterday/Overnight:  Patient underwent a fenestrated endovascular aortic repair with right renal artery angioplasty and left external iliac artery angioplasty and stenting on 2/10 with Dr. Monterroso. Patient does require oxygen during this hospitalization. She is also on O2 at home. She also notes pain in the chest and back. On cardene /60.      Exam:  Bilateral groin sites soft, no hematoma. Palpable bilateral DP pulses.    Impression/Plan:    Discontinue finch.    Physical therapy.    Patient up in the chair.    Chest x-ray due to oxygen needs. Imaging indicates emphysema/COPD, no significant changes    CTA abdomen/pelvis due to chest and back pain.        Medications:  Current Facility-Administered Medications   Medication Dose Route Frequency    albuterol (Ventolin HFA) 108 (90 Base) MCG/ACT inhaler 2 puff  2 puff Inhalation QID PRN    FLUoxetine (PROzac) cap 40 mg  40 mg Oral Daily    umeclidinium bromide (Incruse Ellipta) 62.5 MCG/ACT inhaler 1 puff  1 puff Inhalation Daily    fluticasone-salmeterol (Advair Diskus) 250-50 MCG/ACT inhaler 1 puff  1 puff Inhalation BID    acetaminophen (Tylenol) tab 650 mg  650 mg Oral Q4H PRN    Or    HYDROcodone-acetaminophen (Norco) 5-325 MG per tab 1 tablet  1 tablet Oral Q4H PRN    Or    HYDROcodone-acetaminophen (Norco) 5-325 MG per tab 2 tablet  2 tablet Oral Q4H PRN    ondansetron (Zofran) 4 MG/2ML injection 4 mg  4 mg Intravenous Q6H PRN    metoclopramide (Reglan) 5 mg/mL injection 5 mg  5 mg Intravenous Q8H PRN    lactated ringers infusion   Intravenous Continuous    heparin (Porcine) 5000 UNIT/ML injection 5,000 Units   5,000 Units Subcutaneous Q8H [facility]    ketorolac (Toradol) 15 MG/ML injection 15 mg  15 mg Intravenous 4 times per day    lidocaine-menthol 4-1 % patch 1 patch  1 patch Transdermal Daily    niCARdipine in sodium chloride 0.86% (carDENE) 20 mg/200mL infusion premix  5-15 mg/hr Intravenous Continuous    pantoprazole (Protonix) 40 mg in sodium chloride 0.9% PF 10 mL IV push  40 mg Intravenous QAM AC    Or    pantoprazole (Protonix) DR tab 40 mg  40 mg Oral QAM AC       Laboratory/Data:    LABS:  Recent Labs   Lab 02/10/25  0655 02/10/25  1348 02/11/25  0408   WBC  --  12.1* 13.6*   HGB  --  10.9* 10.6*   MCV  --  87.2 85.0   PLT  --  157.0 174.0   INR 1.00  --  1.04       Recent Labs   Lab 02/10/25  1348 02/11/25  0408    140   K 5.6* 3.7    104   CO2 25.0 26.0   BUN 17 15   CREATSERUM 0.76 0.70   * 153*   CA 8.6* 9.5     Recent Labs   Lab 02/10/25  0655 02/11/25  0408   PTP 13.3 13.8   INR 1.00 1.04   PTT 33.2 29.3     Recent Labs   Lab 02/10/25  1348   ALT 11   AST 34*   ALB 4.2     No results for input(s): \"TROP\" in the last 168 hours.  No results found for: \"ANAS\", \"DANNA\", \"ANASCRN\"  No results for input(s): \"PCACT\", \"PSACT\", \"AT3ACT\", \"HIPAB\", \"PATHI\", \"STALA\", \"DRVVTRATIO\", \"DRVVT\", \"STACLOT\", \"CARIGG\", \"I2TY0GLFSK\", \"L6MC5WTIPF\", \"RA\", \"HAVIGM\", \"HBCIGM\", \"HCVAB\", \"HBSAG\", \"HBCAB\", \"HBVDNAINTERP\", \"ANAS\", \"C3\", \"C4\" in the last 168 hours.    RANJIT Bradley  2/11/2025  9:46 AM

## 2025-02-11 NOTE — PROGRESS NOTES
[facility]    Finn Carl Patient Status:  Inpatient    10/14/1940 MRN [id number]   Location [facility] 4SW-A Attending Jethro Monterroso MD   Hosp Day # 1 PCP Gisele Faulkner MD     SUBJECTIVE: Pt with N/V overnight, CT a/p with circumferential wall thickening of ascending and transverse colon.  Pt states that breathing is stable, no significant cough.      OBJECTIVE:  BP (!) 183/67 (BP Location: Radial)   Pulse 98   Temp 97.9 °F (36.6 °C) (Temporal)   Resp 16   Ht 64\"   Wt 118 lb 6.2 oz (53.7 kg)   SpO2 92%   BMI 20.32 kg/m²   O2 requirement: 5L     I/O last 3 completed shifts:  In: 2708.2 [P.O.:200; I.V.:2508.2]  Out: 2650 [Urine:2650]  I/O this shift:  In: 413.9 [I.V.:413.9]  Out: 1150 [Urine:1150]     Current Medications:   Current Facility-Administered Medications:     piperacillin-tazobactam (Zosyn) 3.375 g in dextrose 5% 100 mL IVPB-ADDV, 3.375 g, Intravenous, Q8H    albuterol (Ventolin HFA) 108 (90 Base) MCG/ACT inhaler 2 puff, 2 puff, Inhalation, QID PRN    FLUoxetine (PROzac) cap 40 mg, 40 mg, Oral, Daily    umeclidinium bromide (Incruse Ellipta) 62.5 MCG/ACT inhaler 1 puff, 1 puff, Inhalation, Daily    fluticasone-salmeterol (Advair Diskus) 250-50 MCG/ACT inhaler 1 puff, 1 puff, Inhalation, BID    acetaminophen (Tylenol) tab 650 mg, 650 mg, Oral, Q4H PRN **OR** HYDROcodone-acetaminophen (Norco) 5-325 MG per tab 1 tablet, 1 tablet, Oral, Q4H PRN **OR** HYDROcodone-acetaminophen (Norco) 5-325 MG per tab 2 tablet, 2 tablet, Oral, Q4H PRN    ondansetron (Zofran) 4 MG/2ML injection 4 mg, 4 mg, Intravenous, Q6H PRN    metoclopramide (Reglan) 5 mg/mL injection 5 mg, 5 mg, Intravenous, Q8H PRN    lactated ringers infusion, , Intravenous, Continuous    heparin (Porcine) 5000 UNIT/ML injection 5,000 Units, 5,000 Units, Subcutaneous, Q8H ANNE    ketorolac (Toradol) 15 MG/ML injection 15 mg, 15 mg, Intravenous, 4 times per day    lidocaine-menthol 4-1 % patch 1 patch, 1 patch, Transdermal, Daily     niCARdipine in sodium chloride 0.86% (carDENE) 20 mg/200mL infusion premix, 5-15 mg/hr, Intravenous, Continuous    pantoprazole (Protonix) 40 mg in sodium chloride 0.9% PF 10 mL IV push, 40 mg, Intravenous, QAM AC **OR** pantoprazole (Protonix) DR tab 40 mg, 40 mg, Oral, QAM AC      Physical Exam:                          General: alert, cooperative, in NAD                          HEENT: oropharynx clear without erythema or exudates, moist mucous membranes                          Lungs: Clear to auscultation bilaterally, no wheezes or crackles                           Chest wall: No tenderness or deformity.                          Heart: Regular rate and rhythm, normal S1S2                          Abdomen: soft, non-tender, non-distended, positive BS.                          Extremity: No clubbing or cyanosis. no edema                          Skin: No rashes or lesions.       Lab Results   Component Value Date    WBC 13.6 02/11/2025    RBC 3.81 02/11/2025    HGB 10.6 02/11/2025    HCT 32.4 02/11/2025    MCV 85.0 02/11/2025    MCH 27.8 02/11/2025    MCHC 32.7 02/11/2025    RDW 14.1 02/11/2025    .0 02/11/2025     Lab Results   Component Value Date     02/11/2025    K 3.7 02/11/2025     02/11/2025    CO2 26.0 02/11/2025    BUN 15 02/11/2025    CREATSERUM 0.70 02/11/2025     02/11/2025    CA 9.5 02/11/2025     Lab Results   Component Value Date    INR 1.04 02/11/2025    INR 1.00 02/10/2025    INR 1.04 10/04/2023          Imaging: I have independently visualized all relevant chest imaging in PACS.  I agree with the radiology interpretation except where noted.    PFT 2/2024. FVC 2.67 L (117%) , FEV1 1.11 L (64%) , ratio 42% , %, % , DLCO 22% , DLCO/VA 23%    6MWT 11/2023: 1 L at rest, 6 L with activity      ASSESSMENT/PLAN:  Acute on chronic hypoxemic respiratory failure: suspect atelectasis in the post operative period  -per last SST needs 1L at rest and 6L with activity, pt  states that she has been using 6L all the time - currently on 5L at rest  -CXR without focal infiltrate.  Lung images on CT a/p with LLL consolidation with air bronchograms, slightly increased from 12/2024.  Pt started on zosyn for colitis, this should also cover any developing aspiration PNA in this region  -encourage IS   -wean O2 as able  COPD/emphysema: no evidence of exacerbation  -OP PFTs with mild obstruction with severe decrease in diffusion capacity  -advair/incruse for home trelegy  -BD protocol  -previously worked up for endobronchial valves as OP, deemed not a candidate  Pulm HTN: RVSP: 39mmHg on echo in 2/2023  -maintain euvolemia   Enlarging abdominal aortic aneurysm  -s/p endovascular AAA stent graft repair with fenestrated device 2/10 with Dr. Monterroso  -BP management per vascular surgery   -pain control  Colitis  -on zosyn  -surgery eval appreciated  Dispo:  -DNAR/select  -will follow   -discussed with family at bedside     Miley Jacques MD  2/11/2025  1:49 PM

## 2025-02-11 NOTE — PLAN OF CARE
Received patient after report. Patient resting in bed on nasal canula. Reports severe nausea. Emesis after anti-emetics administered. Able to tolerate clear liquids later. Chronic pain noted, see MAR. CT abd/pelv. Patient received on amount of oxygen she reports wearing at home. Able to wean some. Remains on Cardene. Arterial line intact. Moreno removed, voiding in bathroom. Up to chair. Family visiting at bedside. Updated on plan of care.

## 2025-02-11 NOTE — PLAN OF CARE
Received pt from OR   Pt drowsy but complaining of 10/10 pain in back and chest from positioning and chronic pain. PRNs given  Pt on her baseline 6L HFNC, no SOB noted.   HR stable SBP goals per Vascular MD is 100-160, this was maintained w/ medications as listed in MAR.  No BM, Bowel sound active, no abd pain. Multiple episodes of emesis and nausea not relieved by PRNs. CCAPRN made aware. Emesis is green in color. Time of last emesis was 1830.  Moreno in place. Good UO noted    Family updated and educated at the bedside as needed   NOC endorsed all events during shift

## 2025-02-11 NOTE — PLAN OF CARE
Assumed care of pt around 1930. Pt A&Ox4. Received pt on 6L HFNC. Pt reports that they are normally on 6L at home. Pt denies SOB. Maintaining SpO2 > 95%.    Pt s/p endovascular abdominal aortic aneurysm repair. Bilateral groins accessed. Both groins soft, no hematoma. Occlusive dressings in place C/D/I. Pt denies numbness/tingling, pedal pulses palpable. NSR on tele, HR 80s-90s. Titrating cardene gtt to maintain SBP < 160. Pt c/o mid chest and back pain. ICU APN notified, STAT EKG done and Troponins drawn. PRN one time Dilaudid given for pain with mild improvement.     Pt very nauseous. PRN Zofran and Reglan given as able, pt reports minimal improvement. Non-pharmacological methods attempted with the Quease Free patch placed. Bmx2. Moreno catheter in place with great UOP. Pain being managed with scheduled Toradol and other PRNs with mild relief. Ambulate up to bathroom and chair with standby assist and gait belt, tolerated well. Pt updated on plan of care.       Problem: CARDIOVASCULAR - ADULT  Goal: Maintains optimal cardiac output and hemodynamic stability  Description: INTERVENTIONS:  - Monitor vital signs, rhythm, and trends  - Monitor for bleeding, hypotension and signs of decreased cardiac output  - Evaluate effectiveness of vasoactive medications to optimize hemodynamic stability  - Monitor arterial and/or venous puncture sites for bleeding and/or hematoma  - Assess quality of pulses, skin color and temperature  - Assess for signs of decreased coronary artery perfusion - ex. Angina  - Evaluate fluid balance, assess for edema, trend weights  Outcome: Progressing  Goal: Absence of cardiac arrhythmias or at baseline  Description: INTERVENTIONS:  - Continuous cardiac monitoring, monitor vital signs, obtain 12 lead EKG if indicated  - Evaluate effectiveness of antiarrhythmic and heart rate control medications as ordered  - Initiate emergency measures for life threatening arrhythmias  - Monitor electrolytes and  administer replacement therapy as ordered  Outcome: Progressing

## 2025-02-11 NOTE — PHYSICAL THERAPY NOTE
PHYSICAL THERAPY EVALUATION - INPATIENT     Room Number: 465/465-A  Evaluation Date: 2/11/2025  Type of Evaluation: Initial  Physician Order: PT Eval and Treat    Presenting Problem: Juxtarenal abdominal aortic aneurysm without rupture s/p endovascular aortic repair  Co-Morbidities : Cervical DDD and spinal stenosis, chronic LBP, COPD, chronic diastolic heart failure, pulm HTN  Reason for Therapy: Mobility Dysfunction and Discharge Planning    PHYSICAL THERAPY ASSESSMENT   Patient is a 84 year old female admitted 2/10/2025 for AAA s/p endovascular repair.  Prior to admission, patient's baseline is independent.  Patient is currently functioning near baseline with bed mobility, transfers, gait, and standing prolonged periods.  Patient is requiring stand-by assist, contact guard assist, and minimal assist as a result of the following impairments: decreased endurance/aerobic capacity, pain, impaired standing balance, decreased muscular endurance, and medical status.      Patient will benefit from continued skilled PT Services for duration of hospitalization, however, given the patient is functioning near baseline level do not anticipate skilled therapy needs at discharge .    PLAN DURING HOSPITALIZATION  Nursing Mobility Recommendation : 1 Assist     PT Treatment Plan: Bed mobility;Endurance;Patient education;Family education;Gait training;Neuromuscular re-educate;Strengthening;Stair training;Transfer training;Balance training  Rehab Potential : Good  Frequency (Obs): 3-5x/week     CURRENT GOALS    Goal #1 Patient is able to demonstrate supine - sit EOB @ level: modified independent     Goal #2 Patient is able to demonstrate transfers Sit to/from Stand at assistance level: supervision     Goal #3 Patient is able to ambulate 150 feet with assist device: none at assistance level: supervision     Goal #4    Goal #5    Goal #6    Goal Comments: Goals established on 2/11/2025      PHYSICAL THERAPY MEDICAL/SOCIAL  HISTORY  History related to current admission: Patient is a 84 year old female admitted on 2/10/2025 with AAA for planned surgical intervention.  Pt is s/p fenestrated endovascular aortic repair and left external iliac artery angioplasty and stenting with bare-mental stent.    HOME SITUATION  Type of Home: House  Home Layout: Multi-level;Able to live on main level  Stairs to Enter : 1   Railing: No    Stairs to Bedroom: 0         Lives With: Son    Drives: Yes   Patient Regularly Uses: Home O2 (6)    Pt owned equipment: Cane    Prior Level of [place]: The pt reports that she is typically independent with ambulation and I/ADL's.  Pt's son lives with her and will be available to assist as needed upon discharge.    SUBJECTIVE  \"The surgery was nothing.  It's the chronic pain.\"    OBJECTIVE  Precautions: Bed/chair alarm  Fall Risk: High fall risk    WEIGHT BEARING RESTRICTION     PAIN ASSESSMENT  Ratin  Location: back  Management Techniques: Activity promotion;Repositioning    COGNITION  Overall Cognitive Status:  WFL - within functional limits    RANGE OF MOTION AND STRENGTH ASSESSMENT  Upper extremity ROM and strength are within functional limits     Lower extremity ROM is within functional limits     Lower extremity strength is within functional limits     BALANCE  Static Sitting: Good  Dynamic Sitting: Fair  Static Standing: Poor +  Dynamic Standing: Poor +    ACTIVITY TOLERANCE  O2 Saturation at rest 97% and with activity 93%  Room air  Heart Rate: at rest  and with activity 120 bpm           AM-PAC '6-Clicks' INPATIENT SHORT FORM - BASIC MOBILITY  How much difficulty does the patient currently have...  Patient Difficulty: Turning over in bed (including adjusting bedclothes, sheets and blankets)?: A Little   Patient Difficulty: Sitting down on and standing up from a chair with arms (e.g., wheelchair, bedside commode, etc.): A Little   Patient Difficulty: Moving from lying on back to sitting on the  side of the bed?: A Little   How much help from another person does the patient currently need...   Help from Another: Moving to and from a bed to a chair (including a wheelchair)?: A Little   Help from Another: Need to walk in hospital room?: A Little   Help from Another: Climbing 3-5 steps with a railing?: A Little     AM-PAC Score:  Raw Score: 18   Approx Degree of Impairment: 46.58%   Standardized Score (AM-PAC Scale): 43.63   CMS Modifier (G-Code): CK    FUNCTIONAL ABILITY STATUS  Gait Assessment   Functional Mobility/Gait Assessment  Gait Assistance: Minimum assistance  Distance (ft): 40  Assistive Device: None  Pattern:  (step through gait pattern)    Skilled Therapy Provided     Bed Mobility:  Rolling: SBA  Supine to sit: SBA   Sit to supine: NT     Transfer Mobility:  Sit to stand: CGA   Stand to sit: CGA  Gait = Min A, mild increase in sway, decreased gait speed    Therapist's Comments: Pt reports that pain limits further activity.  Discussed use of an AD for energy conservation and pain control.  Pt agreeable to use AD as needed, will plan to trial next session as needed.    Exercise/Education Provided:  Bed mobility  Energy conservation  Functional activity tolerated  Gait training  Neuromuscular re-educate  Strengthening  Transfer training  Activity recommendations while admitted: sit in chair for all meals, ambulate TID with nursing staff    Patient End of Session: Up in chair;Needs met;Call light within reach;RN aware of session/findings;All patient questions and concerns addressed;Hospital anti-slip socks;Alarm set;Family present      Patient Evaluation Complexity Level:  History High - 3 or more personal factors and/or co-morbidities   Examination of body systems Moderate - addressing a total of 3 or more elements   Clinical Presentation  Moderate - Evolving   Clinical Decision Making Moderate Complexity       PT Session Time: 30 minutes  Gait Training: 10 minutes  Therapeutic Activity: 5  minutes

## 2025-02-11 NOTE — PROGRESS NOTES
Finn Carl Patient Status:  Inpatient    10/14/1940 MRN [id number]   Location [facility] 4SW-A Attending Jethro Monterroso MD   Hosp Day # 1 PCP Gisele Faulkner MD     Critical Care Progress Note          Subjective:  Nausea and vomiting overnight/this AM.     Objective:    Medications:   FLUoxetine HCl  40 mg Oral Daily    umeclidinium bromide  1 puff Inhalation Daily    fluticasone-salmeterol  1 puff Inhalation BID    heparin  5,000 Units Subcutaneous Q8H ANNE    ketorolac  15 mg Intravenous 4 times per day    lidocaine-menthol  1 patch Transdermal Daily    pantoprazole  40 mg Intravenous QAM AC    Or    pantoprazole  40 mg Oral QAM AC              Intake/Output Summary (Last 24 hours) at 2025 0823  Last data filed at 2025 0700  Gross per 24 hour   Intake 2708.15 ml   Output 2775 ml   Net -66.85 ml       BP (!) 183/67 (BP Location: Radial)   Pulse 92   Temp 98.9 °F (37.2 °C) (Temporal)   Resp 18   Ht 162.6 cm (5' 4\")   Wt 118 lb 6.2 oz (53.7 kg)   SpO2 97%   BMI 20.32 kg/m²     Physical Exam:   General Appearance: Alert, cooperative, no distress, appears stated age  Neck: No JVD, neck supple, no adenopathy, trachea midline  Lungs: Clear to auscultation bilaterally, respirations unlabored  Heart: Regular rate and rhythm, S1 and S2 normal, no murmur, rub or gallop  Abdomen: Soft, non-tender, bowel sounds active all four quadrants, no masses, no organomegaly  Extremities: Extremities normal, atraumatic, no cyanosis or edema,capillary refill <3 sec.    Pulses: 2+ and symmetric all extremities  Skin: Skin color, texture, turgor normal for ethnicity, no rashes or lesions, warm and dry      Recent Labs   Lab 25  0408   RBC 3.81   HGB 10.6*   HCT 32.4*   MCV 85.0   MCH 27.8   MCHC 32.7   RDW 14.1   WBC 13.6*   .0     Recent Labs   Lab 02/10/25  1348 25  0408   * 153*   BUN 17 15   CREATSERUM 0.76 0.70   CA 8.6* 9.5   ALB 4.2  --     140   K 5.6* 3.7    104    CO2 25.0 26.0   ALKPHO 80  --    AST 34*  --    ALT 11  --    BILT 0.3  --    TP 6.5  --      No results for input(s): \"ABGPHT\", \"MYKWZA7H\", \"SWQMW9T\", \"ABGHCO3\", \"ABGBE\", \"TEMP\", \"YURI\", \"SITE\", \"DEV\", \"THGB\" in the last 168 hours.    Invalid input(s): \"VMX59AAZ\", \"CHOB\"  No results for input(s): \"BNP\" in the last 168 hours.  No results for input(s): \"TROP\", \"CK\" in the last 168 hours.    XR CHEST AP PORTABLE  (CPT=71045)    Result Date: 2/10/2025  CONCLUSION:  Some patchy airspace opacities are present within the lungs suspicious for areas of pneumonia.  Follow-up is recommended to ensure resolution.  If clinical symptoms persist then consider CT.   LOCATION:  [facility]      Dictated by (CST): Neal Lagunas MD on 2/10/2025 at 2:07 PM     Finalized by (CST): Neal Lagunas MD on 2/10/2025 at 2:08 PM           Assessment/Plan:    AAA s/p endovascular repair             -PRN pain management: scheduled Toradol, lidocaine patch daily, norco  PRN, tylenol PRN             -BP management per vascular surgery             -Vascular surgery following    2. Nausea vomiting   - PRN zofran, reglan given overnight without relief   - CT A/P with circumferential wall thickening of the right side of the colon and transverse colon concerning for colitis which has developed   - Will consult general surgery   - Antiemetics PRN     Hypertension             - Nicardipine, parameters per vascular surgery             - no PTA medications, will discuss with vascular surgery in regards to starting oral regimen.              - Cardiology to see     Hx of chronic diastolic heart failure             -no PTA medications             - TTE 2/2023, EF 55-60%, diastolic dysfunction, mild MR, moderate TR,  RVSP 39              - Cards to see     Hx of CAD/MI             -no PTA medications             -last stress test 4/2023, normal nuclear perfusion, no evidence of myocardial ischemia or infarction             - Cards to see     Chronic hypoxic  respiratory failure  Hx of COPD             - 1 L O2 baseline, titrate to keep sats >88%             - albuterol inhaler QID PRN             - PTA meds include incruse ellipta inhaler, symbicort inhaler, proair inhaler   - Chest xray showing possible pneumonia, however appears asymptomatic at this point.   - Will monitor off antibiotics for now             - [facility] pulmonology following     Hx of degenerative disc disease, lumbar radiculitis and spondylosis, osteoarthritis, osteoporosis             - toradol scheduled q6h             - Lidocaine patch daily  - norco and tylenol PRN     Hx of anxiety, unipolar affective disorder             -scheduled pta med prozac     F/E/N             - lactated ringers             - replace electrolytes per protocol             - ADAT     Ppx             - heparin subcutaneous             - SCD's     Lines/drains             - PIV             - arterial line             - finch catheter      Dispo:   - DNR/select  - ICU monitoring        ICU checklist  Feeding: ADAT  Analgesia: toradol, norco, lidocaine, tylenol  Sedation: N/A  Thromboembolism PPX: heparin, SCD's  Stress Ulcer PPX: N/A  Glucose control: N/A  Bowel Regimen: N/A  Lines/drains/tubes: PIV, a-line, finch  Deescalation of antibiotics: N/A  Therapies:PT/OT/ST when appropriate  Dispo: DNR     Code Status: DNAR/Selective Treatment     Plan of care discussed with intensivist, Dr. Twin Rey  Critical Care  [id number]

## 2025-02-11 NOTE — CONSULTS
[facility]  Report of Consultation    Finn Carl Patient Status:  Inpatient    10/14/1940 MRN [id number]   Location [facility] 4SW-A Attending Jethro Monterroso MD   Hosp Day # 1 PCP Gisele Faulkner MD     25    Reason for Consultation:    Surgical Consultation     CC: abdominal pain     History of Present Illness:    Finn Carl is a a(n) 84 year old female. Patient is POD # 1 endoscopic AAA graft by vascular surgery. She had complained of back pain, nausea.emesis this morning. CTA ordered revealing circumferential thickening of ascending and transverse colon.  General surgery consulted  She denies any abdominal pain currently, nausea resolved  She does report previous hernia repair  Daughter and son in law at bedside   Past Medical History:    Past Medical History:    ANXIETY    Aortic aneurysm    Cervical radiculitis    COPD (chronic obstructive pulmonary disease) (HCC)    Coronary atherosclerosis of unspecified type of vessel, native or graft    H/O degenerative disc disease    HYPERLIPIDEMIA    Irritable bowel syndrome    Lumbar radiculitis    Lumbar spondylosis    OSTEOARTHRITIS    OSTEOPOROSIS    Other and unspecified personal history of malignant neoplasm    Pulmonary hypertension (HCC)    Venous insufficiency       Past Surgical History:    Past Surgical History:   Procedure Laterality Date    Appendectomy      Colonoscopy      Epidurography, radiological s & i  3/12/2012    Procedure: TRANSFORAMINAL EPIDURAL - LUMBAR;  Surgeon: Joe Gonsales MD;  Location: [facility]    Hernia surgery      Hysterectomy      Injection, anesthetic/steroid, transforaminal epidural; lumbar/sacral, single level  2/3/2012    Performed by MALORIE GALICIA at [facility]    Injection, anesthetic/steroid, transforaminal epidural; lumbar/sacral, single level  2012    Procedure: TRANSFORAMINAL EPIDURAL - LUMBAR;  Surgeon: Alex Hartman MD;  Location: [facility]    Injection, w/wo contrast, dx/therapeutic substance, epidural/subarachnoid; cervical/thoracic  3/12/2012    Procedure: TRANSFORAMINAL EPIDURAL - LUMBAR;  Surgeon: Joe Gonsales MD;  Location: [facility]    Injection, w/wo contrast, dx/therapeutic substance, epidural/subarachnoid; cervical/thoracic N/A 3/22/2016    Procedure: CERVICAL EPIDURAL;  Surgeon: Joe Gonsales MD;  Location: [facility]    Ir varicose vein endovenous laser ablation(cpt=36478)  2014    left SSV    Ir varicose vein endovenous laser ablation(cpt=36478) Right 09/2020    Laminectomy      laminectomy    M-sedaj by  phys perfrmg svc 5+ yr  2/3/2012    Performed by MALORIE GALICIA at [facility]    M-sedaj by  phys perfrmg svc 5+ yr  2/21/2012    Procedure: TRANSFORAMINAL EPIDURAL - LUMBAR;  Surgeon: Alex Hartman MD;  Location: [facility]    M-sedaj by  phys perfrmg svc 5+ yr  3/12/2012    Procedure: TRANSFORAMINAL EPIDURAL - LUMBAR;  Surgeon: Joe Gonsales MD;  Location: [facility]    M-sedaj by  phys perfrmg svc 5+ yr N/A 3/22/2016    Procedure: CERVICAL EPIDURAL;  Surgeon: Joe Gonsales MD;  Location: [facility]    Other surgical history      anterior repair of pelvic floor    Patient documented not to have experienced any of the following events N/A 3/22/2016    Procedure: CERVICAL EPIDURAL;  Surgeon: Joe Gonsales MD;  Location: [facility]    Patient withough preoperative order for iv antibiotic surgical site infection prophylaxis. N/A 3/22/2016    Procedure: CERVICAL EPIDURAL;  Surgeon: Joe Gonsales MD;  Location: [facility]    Repair ing hernia,5+y/o,reducibl      Repair shoulder capsule,anterior      Sclerotherapy Bilateral 2014    Special service or report      removal of colon mass    Spinal fusion      cervical fusion    Tonsillectomy       Total abdom hysterectomy      Vein ligation and stripping Bilateral     about 1980       Family History:    Family History   Problem Relation Age of Onset    Heart Disorder Father        Social History:     reports that she quit smoking about 19 years ago. Her smoking use included cigarettes. She has never used smokeless tobacco. She reports that she does not drink alcohol and does not use drugs.    Allergies:    Allergies[1]    Current Medications:      Current Facility-Administered Medications:     albuterol (Ventolin HFA) 108 (90 Base) MCG/ACT inhaler 2 puff, 2 puff, Inhalation, QID PRN    FLUoxetine (PROzac) cap 40 mg, 40 mg, Oral, Daily    umeclidinium bromide (Incruse Ellipta) 62.5 MCG/ACT inhaler 1 puff, 1 puff, Inhalation, Daily    fluticasone-salmeterol (Advair Diskus) 250-50 MCG/ACT inhaler 1 puff, 1 puff, Inhalation, BID    acetaminophen (Tylenol) tab 650 mg, 650 mg, Oral, Q4H PRN **OR** HYDROcodone-acetaminophen (Norco) 5-325 MG per tab 1 tablet, 1 tablet, Oral, Q4H PRN **OR** HYDROcodone-acetaminophen (Norco) 5-325 MG per tab 2 tablet, 2 tablet, Oral, Q4H PRN    ondansetron (Zofran) 4 MG/2ML injection 4 mg, 4 mg, Intravenous, Q6H PRN    metoclopramide (Reglan) 5 mg/mL injection 5 mg, 5 mg, Intravenous, Q8H PRN    lactated ringers infusion, , Intravenous, Continuous    heparin (Porcine) 5000 UNIT/ML injection 5,000 Units, 5,000 Units, Subcutaneous, Q8H ANNE    ketorolac (Toradol) 15 MG/ML injection 15 mg, 15 mg, Intravenous, 4 times per day    lidocaine-menthol 4-1 % patch 1 patch, 1 patch, Transdermal, Daily    niCARdipine in sodium chloride 0.86% (carDENE) 20 mg/200mL infusion premix, 5-15 mg/hr, Intravenous, Continuous    pantoprazole (Protonix) 40 mg in sodium chloride 0.9% PF 10 mL IV push, 40 mg, Intravenous, QAM AC **OR** pantoprazole (Protonix) DR tab 40 mg, 40 mg, Oral, QAM AC    Home Medications:    Medications Ordered Prior to Encounter[2]    Review of Systems:    10 point review performed  pertinent positives and negatives per history of present illness.    Physical Exam:    Blood pressure (!) 183/67, pulse 98, temperature 97.9 °F (36.6 °C), temperature source Temporal, resp. rate 16, height 5' 4\" (1.626 m), weight 118 lb 6.2 oz (53.7 kg), SpO2 92%.    GENERAL: Alert and oriented x 3, well developed, well nourished female, in no apparent distress    SKIN: anicteric    RESPIRATORY: non labored breathing    CARDIOVASCULAR: RRR    ABDOMEN: dressings/bruising noted on bilateral groin, otherwise abdomen is soft, non tender, non distended, no evidence of peritonitis     LYMPHATIC: no lymphadenopathy    EXTREMITIES: no cyanosis, clubbing or edema    .    Laboratory Data:  Recent Labs   Lab 02/10/25  0655 02/10/25  1348 02/11/25  0408   WBC  --  12.1* 13.6*   HGB  --  10.9* 10.6*   MCV  --  87.2 85.0   PLT  --  157.0 174.0   INR 1.00  --  1.04       Recent Labs   Lab 02/10/25  1348 02/11/25  0408    140   K 5.6* 3.7    104   CO2 25.0 26.0   BUN 17 15   CREATSERUM 0.76 0.70   * 153*   CA 8.6* 9.5       Recent Labs   Lab 02/10/25  1348   ALT 11   AST 34*   ALB 4.2       No results for input(s): \"TROP\" in the last 168 hours.          Radiology:    CTA ABD/PEL (CPT=74174)    Result Date: 2/11/2025  PROCEDURE:  CTA ABD/PEL (CPT=74174  COMPARISON:  [facility], CT, CTA ABD/PEL (CPT=74174), 12/15/2024, 5:19 PM.  INDICATIONS:  AAA repair, abdomen and back pain  TECHNIQUE:  CT images of the abdomen, pelvis, and lower extremities were obtained pre- and post- injection of non-ionic intravenous contrast material. Multi-planar reformatted/3-D images were created to optimize visualization of vascular anatomy.  Dose reduction techniques were used. Dose information is transmitted to the ACR (American College of Radiology) NRDR (National Radiology Data Registry) which includes the Dose Index Registry.  PATIENT STATED HISTORY:(As transcribed by Technologist)  Patient had recent AAA repair with nausea,back   and abdominal pain all four quadrants.   CONTRAST USED:  100cc of Isovue 370  FINDINGS:  LUNG BASE:  Consolidation with air bronchograms in the left lower lobe posteriorly has increased slightly since 12/15/2024.  Stable atelectasis in the right lung base.  Chronic interstitial changes bilaterally. LIVER:  There is mild diffuse fatty infiltration of the liver.  BILIARY:  No biliary ductal dilatation. PANCREAS:  Homogeneous enhancement. SPLEEN:  Normal caliber. KIDNEYS:  No hydronephrosis.  ADRENALS:  Normal. AORTA/VASCULAR:  There has been interval placement of an aortic bi-iliac stent graft with bilateral stents in the main renal arteries.  No obvious endoleak.  Native aneurysm lumen measures 4.7 x 4.8 cm.  The fusiform abdominal aortic aneurysm previously measured 4.8 cm in maximum diameter.  There is opacification of the SMA, celiac arteries.  The origin of the JUSTYNA is not visualized. RETROPERITONEUM:  No enlarged adenopathy. BOWEL/MESENTERY:  There is circumferential wall thickening with some pericolonic soft tissue stranding involving the ascending colon and proximal transverse colon.  Findings consistent with right-sided colitis.  Infectious, inflammatory as well as ischemic etiologies should be considered.  Colonic diverticulosis.  ABDOMINAL WALL:  Slight soft tissue stranding in the groin on the right.  PELVIC ORGANS:  Normal for age. BONES:  Mild degenerative changes in the lower lumbar facets.             CONCLUSION:  There has been interval placement of an aorto bi-iliac stent graft.  The native abdominal aortic aneurysm appears relatively stable in diameter measuring up to 4.8 cm.  No obvious endoleak.  The origin of the celiac, SMA and main renal arteries appear patent.  There are bilateral stents in the main renal arteries.  The JUSTYNA is not visualized .  There is circumferential wall thickening of the right side of the colon and transverse colon concerning for colitis which has developed.  Ischemic,  inflammatory and infectious etiology should be considered.  There is colonic diverticulosis.   LOCATION:  [id number]   Dictated by (CST): Kay Hoover MD on 2/11/2025 at 11:13 AM     Finalized by (CST): Kay Hoover MD on 2/11/2025 at 11:21 AM       XR CHEST AP PORTABLE  (CPT=71045)    Result Date: 2/11/2025  PROCEDURE:  XR CHEST AP PORTABLE  (CPT=71045)  TECHNIQUE:  AP chest radiograph was obtained.  COMPARISON:  [facility] , XR, XR CHEST AP PORTABLE  (CPT=71045), 2/10/2025, 1:47 PM.  INDICATIONS:  on o2, post op  PATIENT STATED HISTORY: (As transcribed by Technologist)  Patient offered no additional history at this time.     FINDINGS:  Stable hyperaeration of the lungs with stable associated coarsening of the interstitium primarily within the lower lobe regions consistent with COPD and mild parenchymal scarring.  No acute airspace opacities are noted.  Cardiac silhouette is within normal limits.  Normal pulmonary vasculature.  Mesh metallic stenting of the abdominal aorta is partially visualized and appears stable from prior imaging.  Cervical fusion is also partially visualized and appears unremarkable.            CONCLUSION:  Findings suggest emphysema/COPD with mild parenchymal scarring within bilateral lower lobes.  No significant interval changes.   LOCATION:  [facility]      Dictated by (CST): Eva Rehman DO on 2/11/2025 at 9:32 AM     Finalized by (CST): Eva Rehman DO on 2/11/2025 at 9:33 AM       XR CHEST AP PORTABLE  (CPT=71045)    Result Date: 2/10/2025  PROCEDURE:  XR CHEST AP PORTABLE  (CPT=71045)  TECHNIQUE:  AP chest radiograph was obtained.  COMPARISON:  [facility], XR, XR RIBS WITH CHEST (3 VIEWS), LEFT  (CPT=71101), 12/15/2024, 3:15 PM.  INDICATIONS:  hypoxia  PATIENT STATED HISTORY: (As transcribed by Technologist)   hypoxia    FINDINGS:  There are some patchy airspace opacities within the left lower lobe suspicious for areas of pneumonia.  Heart size is within normal limits.  No pleural effusion is  seen.  Mediastinal and hilar contours are normal.            CONCLUSION:  Some patchy airspace opacities are present within the lungs suspicious for areas of pneumonia.  Follow-up is recommended to ensure resolution.  If clinical symptoms persist then consider CT.   LOCATION:  [facility]      Dictated by (CST): Neal Lagunas MD on 2/10/2025 at 2:07 PM     Finalized by (CST): Neal Lagunas MD on 2/10/2025 at 2:08 PM          Problem List:    Patient Active Problem List   Diagnosis    Postlaminectomy syndrome, lumbar region    Long-term current use of opiate analgesic    Disc displacement, lumbar    Left lumbar radiculitis    Cervical radiculitis    S/P cervical spinal fusion    Spondylosis of cervical region without myelopathy or radiculopathy    DDD (degenerative disc disease), cervical    Cervical spinal stenosis    SOB (shortness of breath)    Abdominal aortic aneurysm (AAA) without rupture    Pulmonary hypertension (HCC)    Chronic obstructive pulmonary disease, unspecified COPD type (HCC)    Localized edema    Chronic diastolic (congestive) heart failure (HCC)    Medicare annual wellness visit, subsequent    Post-menopausal    Dysthymia    Impaired fasting glucose    Sinus headache    Acute maxillary sinusitis, recurrence not specified    Hypoxia    Elevated blood pressure reading without diagnosis of hypertension    Venous stasis ulcer of other part of right lower leg limited to breakdown of skin with varicose veins (HCC)    Right hip pain    Dyspnea, unspecified type    Pneumonia of both lower lobes due to infectious organism    Acute pain of right shoulder    Pneumonia due to infectious organism    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  -CT scan as noted above  -no abdominal pain, nausea resolved  Patient appears non toxic, she is comfortable, soft, non tender, non  distended    Plan:  In light of benign abdominal exam will recommend conservative management.   Will start IV antibiotics  Ok for clear liquids  Serial abdominal exams  Recommend out of bed/IS  IV fluids  If patient should deteriorate will recommend surgical management- reviewed with patient and family at bedside  All questions answered  Patient seen and examined with HEAVEN Flores  [facility]  General Surgery  2/11/2025         [1]   Allergies  Allergen Reactions    Duragesic [Fentanyl] ITCHING     Had rxn to adhesive at one point. Patient denies any reaction to Fentanyl, only to the adhesive   [2]   Current Facility-Administered Medications on File Prior to Encounter   Medication Dose Route Frequency Provider Last Rate Last Admin    [COMPLETED] morphINE PF 4 MG/ML injection 4 mg  4 mg Intravenous Once Maryellen Max MD   4 mg at 12/15/24 1532    [COMPLETED] ondansetron (Zofran) 4 MG/2ML injection 4 mg  4 mg Intravenous Once Maryellen Max MD   4 mg at 12/15/24 1529    [COMPLETED] iopamidol 76% (ISOVUE-370) injection for power injector  70 mL Intravenous ONCE PRN Maryellen Max MD   70 mL at 12/15/24 1752    [COMPLETED] HYDROcodone-acetaminophen (Norco) 5-325 MG per tab 1 tablet  1 tablet Oral Once Maryellen Max MD   1 tablet at 12/15/24 1826     Current Outpatient Medications on File Prior to Encounter   Medication Sig Dispense Refill    HYDROcodone-acetaminophen  MG Oral Tab Take 1 tablet by mouth every 8 (eight) hours as needed for Pain.      HYDROcodone-acetaminophen 5-325 MG Oral Tab Take 1 tablet by mouth every 6 (six) hours as needed for Pain.      Cholecalciferol (VITAMIN D) 25 MCG (1000 UT) Oral Tab Take 1 tablet by mouth daily.      INCRUSE ELLIPTA 62.5 MCG/INH Inhalation Aerosol Powder, Breath Activated Inhale 1 puff into the lungs daily. 30 each 1    FLUOXETINE HCL 40 MG Oral Cap TAKE 1 CAPSULE(40 MG) BY MOUTH DAILY 90 capsule 0    PROAIR  (90 Base)  MCG/ACT Inhalation Aero Soln Inhale 2 puffs into the lungs 4 (four) times daily as needed. 1 Inhaler 12    Multiple Vitamins-Minerals (MULTIVITAL) Oral Chew Tab Chew 1 tablet by mouth daily.      SYMBICORT 160-4.5 MCG/ACT Inhalation Aerosol INL 2 PFS PO BID. RM AFTER EACH U (Patient taking differently: Inhale 2 puffs into the lungs 2 (two) times daily.) 1 Inhaler 5    Calcium Carbonate (CALCIUM 500 OR) Take 2 tablets by mouth daily.

## 2025-02-11 NOTE — OCCUPATIONAL THERAPY NOTE
OCCUPATIONAL THERAPY EVALUATION - INPATIENT     Room Number: 465/465-A  Evaluation Date: 2/11/2025  Type of Evaluation: Initial  Presenting Problem: AAA repair    Physician Order: IP Consult to Occupational Therapy  Reason for Therapy: ADL/IADL Dysfunction and Discharge Planning    OCCUPATIONAL THERAPY ASSESSMENT   Patient is currently functioning near baseline with toileting, lower body dressing, grooming, bed mobility, transfers, static sitting balance, functional standing tolerance, and energy conservation strategies. Prior to admission, patient's baseline is independent on home O2 with self care  and mobility, drives, lives with son who is able to assist as needed.  Patient is requiring supervision to CGA as a result of the following impairments: decreased endurance, pain, and impaired standing balance. Occupational Therapy will continue to follow for duration of hospitalization.    Patient will benefit from continued skilled OT Services for duration of hospitalization, however, given the patient is functioning near baseline level do not anticipate skilled therapy needs at discharge     History Related to Current Admission: Patient is a 84 year old female admitted on 2/10/2025 with Presenting Problem: AAA repair. Co-Morbidities : chronic back pain, post-laminectomy syndrome, COPD, HF, CAD, HTN    WEIGHT BEARING RESTRICTION       Recommendations for nursing staff:   Transfers: 1 person   Toileting location: bathroom     EVALUATION SESSION:  Patient Start of Session: supine    FUNCTIONAL TRANSFER ASSESSMENT  Sit to Stand: Edge of Bed  Edge of Bed: Supervision    BED MOBILITY  Supine to Sit : Supervision  Scooting: isupervision    BALANCE ASSESSMENT  Static Sitting: Supervision  Static Standing: Supervision    FUNCTIONAL ADL ASSESSMENT  Grooming Standing: Supervision  LB Dressing Seated: Minimal Assist  LB Dressing Standing: Stand-by Assist  Toileting Seated: Supervision  Toileting Standing: Contact Guard  Assist    ACTIVITY TOLERANCE: stable O2 on 6L           BP: 150/71             O2 SATURATIONS  Oxygen Therapy  SPO2% on Oxygen at Rest: 96  At rest oxygen flow (liters per minute): 6  SPO2% Ambulation on Oxygen: 93  Ambulation oxygen flow (liters per minute): 6    COGNITION  Overall Cognitive Status:  WFL - within functional limits    Upper Extremity   ROM: within functional limits   Strength: within functional limits   Coordination  Gross motor: wfl  Fine motor: wf  Sensation: denied deficits    EDUCATION PROVIDED  Patient Education : Role of Occupational Therapy; Plan of Care; Functional Transfer Techniques; Posture/Positioning  Patient's Response to Education: Verbalized Understanding    Equipment used: gait belt  Demonstrates functional use, Would benefit from additional trial yes     Therapist comments: OT explained role. Patient completed functional mobility to bathroom with CGA, seated hygiene with supervision, donned Depends with CGA, standing hand hygiene SBA. Limited tolerance to short distance hallway ambulation due to back pain. Patient educated on importance of sitting in chair. Left with needs met, alarm on , family present, call light in reach. Nurse aware of session    Patient End of Session: Hospital anti-slip socks;All patient questions and concerns addressed;RN aware of session/findings;Call light within reach;Needs met;Up in chair;Family present;Alarm set    OCCUPATIONAL PROFILE    HOME SITUATION  Type of Home: House  Home Layout: Multi-level;Able to live on main level  Lives With: Son    Toilet and Equipment: Standard height toilet  Shower/Tub and Equipment: Walk-in shower;Shower chair;Grab bar       Occupation/Status: retired     Drives: Yes  Patient Regularly Uses: Home O2;Other (Comment) (1 to 6L home O2)    Prior Level of Function: Independent with mobility, self care, driving, light IADLs. Lives with son who is caregiver for patient and able to assist as needed.    SUBJECTIVE   \"That wasn't  a big deal, its my back\"  ( re: surgery )    PAIN ASSESSMENT  Ratin  Location: low back, chronic  Management Techniques: Nurse notified;Repositioning;Relaxation    OBJECTIVE  Precautions: Bed/chair alarm  Fall Risk: High fall risk    ASSESSMENTS    AM-PAC ‘6-Clicks’ Inpatient Daily Activity Short Form  -   Putting on and taking off regular lower body clothing?: A Little  -   Bathing (including washing, rinsing, drying)?: A Little  -   Toileting, which includes using toilet, bedpan or urinal? : A Little  -   Putting on and taking off regular upper body clothing?: A Little  -   Taking care of personal grooming such as brushing teeth?: None  -   Eating meals?: None    AM-PAC Score:  Score: 20  Approx Degree of Impairment: 38.32%  Standardized Score (AM-PAC Scale): 42.03    ADDITIONAL TESTS     NEUROLOGICAL FINDINGS      COGNITION ASSESSMENTS     PLAN  OT Device Recommendations: None  OT Treatment Plan: Endurance training;Functional transfer training;Energy conservation/work simplification techniques;Neuromuscluar reeducation;Equipment eval/education;Patient/Family training;Compensatory technique education  Rehab Potential : Good  Frequency: 3-5x/week  Number of Visits to Meet Established Goals: 2    ADL Goals   Patient will perform grooming: with modified independent  Patient will perform lower body dressing:  with modified independent  Patient will perform toileting: with modified independent    Functional Transfer Goals  Patient will transfer to toilet:  with modified independent    Additional Goals  Patient will describe energy conservation techniques she can use for I/ADLs      Patient Evaluation Complexity Level:   Occupational Profile/Medical History MODERATE - Expanded review of history including review of medical or therapy record   Specific performance deficits impacting engagement in ADL/IADL MODERATE  3 - 5 performance deficits   Client Assessment/Performance Deficits MODERATE - Comorbidities and min to  mod modifications of tasks    Clinical Decision Making LOW - Analysis of occupational profile, problem-focused assessments, limited treatment options    Overall Complexity LOW     OT Session Time: 23 minutes  Self-Care Home Management: 10 minutes  Therapeutic Activity: 8 minutes

## 2025-02-11 NOTE — PROGRESS NOTES
Vascular Surgery Progress Note    /71   Pulse 98   Temp 98.3 °F (36.8 °C) (Temporal)   Resp 18   Ht 5' 4\" (1.626 m)   Wt 118 lb 6.2 oz (53.7 kg)   SpO2 (!) 89%   BMI 20.32 kg/m²     Recent Labs   Lab 02/10/25  1348 02/11/25  0408   RBC 3.84 3.81   HGB 10.9* 10.6*   HCT 33.5* 32.4*   MCV 87.2 85.0   MCH 28.4 27.8   MCHC 32.5 32.7   RDW 13.9 14.1   WBC 12.1* 13.6*   .0 174.0       Recent Labs   Lab 02/10/25  1348 02/11/25  0408   * 153*   BUN 17 15   CREATSERUM 0.76 0.70   CA 8.6* 9.5    140   K 5.6* 3.7    104   CO2 25.0 26.0       I have reviewed the CTA. Her aneurysm repair is intact. All visceral vessels are patent and there is no endoleak. She has some colonic wall thickening but all branches of the SMA are widely patent without stenosis and all visceral stents are patent. No intervention needed. Continue conservative measures and advance diet given benign abdominal exam.     Jethro Monterroso MD  [facility]  Vascular Surgery

## 2025-02-11 NOTE — CONSULTS
[facility]  Report of Consultation    Finn Carl Patient Status:  Inpatient    10/14/1940 MRN [id number]   [facility] 4SW-A Attending Jethro Monterroso MD   Hosp Day # 1 PCP Gisele Faulkner MD     Reason for Consultation:   AAA s/p repair / CP    History of Present Illness:   Finn Carl is a(n) 84 year old female with a past history of HTN, HL, anxiety, CAD, and COPD on home O2.  She has undergone cardiac cath roughly 10 years ago with non-obstructive CAD at that time.  Pt has undergone stress perfusion testing in  with Sx and abn ECG response, however normal perfusion.  Pt has a known AAA and presented for endovascular repair.  Pt underwent right renal artery angioplasty of renal artery stenosis, fenestrated endovascular aortic repair with 3 fenestrations, and left external iliac artery angioplasty with stenting with bare metal stent.  Pt tolerated procedure well.  Post-procedurally, pt has noted nausea and emesis.  In addition has reported chest discomfort.  Pt states that she has had chronic back and neck discomforts.  These Sx differ from her chronic sensations.  No associated SOB.  No diaphoresis.        Past Medical History:   Past Medical History:    ANXIETY    Aortic aneurysm    Cervical radiculitis    COPD (chronic obstructive pulmonary disease) (HCC)    Coronary atherosclerosis of unspecified type of vessel, native or graft    H/O degenerative disc disease    HYPERLIPIDEMIA    Irritable bowel syndrome    Lumbar radiculitis    Lumbar spondylosis    OSTEOARTHRITIS    OSTEOPOROSIS    Other and unspecified personal history of malignant neoplasm    Pulmonary hypertension (HCC)    Venous insufficiency       Social History:   Smoking:  Quit 17 years  ago.  40+ years at 1 ppd  Alcohol:  None    Family History:   Positive family history of premature atherosclerotic heart disease - father MI 50s.    Medications:   Scheduled:    FLUoxetine HCl  40 mg Oral Daily    umeclidinium bromide  1  puff Inhalation Daily    fluticasone-salmeterol  1 puff Inhalation BID    heparin  5,000 Units Subcutaneous Q8H ANNE    ketorolac  15 mg Intravenous 4 times per day    lidocaine-menthol  1 patch Transdermal Daily    pantoprazole  40 mg Intravenous QAM AC    Or    pantoprazole  40 mg Oral QAM AC       Continuous Infusion:    lactated ringers 50 mL/hr at 02/11/25 0600    niCARdipine 5 mg/hr (02/11/25 0600)       PRN Medications:     albuterol    acetaminophen **OR** HYDROcodone-acetaminophen **OR** HYDROcodone-acetaminophen    ondansetron    metoclopramide    Outpatient Medications:   Medications Ordered Prior to Encounter[1]    Allergies:   Allergies[2]    Review of Systems:   No fevers, chills, change in weight or bowel habits.  Ten point review of systems is otherwise negative or unremarkable.    Physical Exam:   Vitals:    02/11/25 0800   BP:    Pulse: 92   Resp: 18   Temp: 97.9 °F (36.6 °C)     Wt Readings from Last 3 Encounters:   02/11/25 118 lb 6.2 oz (53.7 kg)   12/15/24 120 lb (54.4 kg)   08/08/24 123 lb (55.8 kg)           General: Well developed, thin female.  Pt is in no acute distress.  HEENT:   Normocephalic.  Atraumatic.  Eyes with no scleral icterus.  Neck: Supple.  No JVD.  Carotids 2+ and equal in symmetric fashion.  No bruits are noted.  Cardiac: Regular rate and rhythm.   There is a normal S1 and S2.  No S3 or S4.  No murmurs, rubs, or gallops.  PMI is non-displaced with a normal apical impulse.  Lungs: Clear to ascultation bilaterally.  No focal rales, rhonchi, or wheezes.  Fair air movement is noted throughout all lung fields.  Abdomen: Soft.  Non-distended.  Non-tender.  Bowel sounds are present and normoactive.  No guarding or rebound.   Extremities: Extremities do not demonstrate any evidence of peripheral edema.   No cyanosis or clubbing of the digits is appreciated.  Femoral, Dorsalis Pedis, and Posterior Tibialis  pulses are 2+ and equal in a symmetric fashion.  Neurologic: Alert and  oriented, normal affect.  No gross deficit appreciated.  Integument:  No visible rashes are appreciated.      Laboratories and Data:   Labs:    Recent Labs   Lab 02/10/25  1348 02/11/25  0408   * 153*   BUN 17 15   CREATSERUM 0.76 0.70   CA 8.6* 9.5   ALB 4.2  --     140   K 5.6* 3.7    104   CO2 25.0 26.0   ALKPHO 80  --    AST 34*  --    ALT 11  --    BILT 0.3  --    TP 6.5  --        Recent Labs   Lab 02/10/25  1348 02/11/25  0408   RBC 3.84 3.81   HGB 10.9* 10.6*   HCT 33.5* 32.4*   MCV 87.2 85.0   MCH 28.4 27.8   MCHC 32.5 32.7   RDW 13.9 14.1   WBC 12.1* 13.6*   .0 174.0       Recent Labs   Lab 02/10/25  0655 02/11/25  0408   PTP 13.3 13.8   INR 1.00 1.04       No results for input(s): \"TROP\", \"CK\" in the last 168 hours.    Diagnostics:   Tele:  SR / ST  EKG: .  Non-specific ST and T abn      Assessment:   AAA  -S/P endovascular repair, as above  2.     Chest pain   -Atypical, however multiple risk factors  3.     HTN   -Reports that his torically BP runs ion the lower end   -On Nicardipine  4.     HL  5.     COPD   -On chronic home O2  6.    H/O CAD  7.    H/O anxiety  8.    Pulmaonry HTN           Plan:   ECG   Serial CV iso   Pain control   Minimizing narcotic given nauseea   Tele    O2   COPD optimization  -Pulmonary following  8.    Titrate Nicardipine as hemodynamics dictate    Juan Alberto Durham MD  2/11/2025  9:05 AM         [1]   Current Facility-Administered Medications on File Prior to Encounter   Medication Dose Route Frequency Provider Last Rate Last Admin    [COMPLETED] morphINE PF 4 MG/ML injection 4 mg  4 mg Intravenous Once Maryellen Max MD   4 mg at 12/15/24 1532    [COMPLETED] ondansetron (Zofran) 4 MG/2ML injection 4 mg  4 mg Intravenous Once Maryellen Max MD   4 mg at 12/15/24 1529    [COMPLETED] iopamidol 76% (ISOVUE-370) injection for power injector  70 mL Intravenous ONCE PRN Maryellen Max MD   70 mL at 12/15/24 1756    [COMPLETED]  HYDROcodone-acetaminophen (Norco) 5-325 MG per tab 1 tablet  1 tablet Oral Once Maryellen Max MD   1 tablet at 12/15/24 5132     Current Outpatient Medications on File Prior to Encounter   Medication Sig Dispense Refill    HYDROcodone-acetaminophen  MG Oral Tab Take 1 tablet by mouth every 8 (eight) hours as needed for Pain.      HYDROcodone-acetaminophen 5-325 MG Oral Tab Take 1 tablet by mouth every 6 (six) hours as needed for Pain.      Cholecalciferol (VITAMIN D) 25 MCG (1000 UT) Oral Tab Take 1 tablet by mouth daily.      INCRUSE ELLIPTA 62.5 MCG/INH Inhalation Aerosol Powder, Breath Activated Inhale 1 puff into the lungs daily. 30 each 1    FLUOXETINE HCL 40 MG Oral Cap TAKE 1 CAPSULE(40 MG) BY MOUTH DAILY 90 capsule 0    PROAIR  (90 Base) MCG/ACT Inhalation Aero Soln Inhale 2 puffs into the lungs 4 (four) times daily as needed. 1 Inhaler 12    Multiple Vitamins-Minerals (MULTIVITAL) Oral Chew Tab Chew 1 tablet by mouth daily.      SYMBICORT 160-4.5 MCG/ACT Inhalation Aerosol INL 2 PFS PO BID. RM AFTER EACH U (Patient taking differently: Inhale 2 puffs into the lungs 2 (two) times daily.) 1 Inhaler 5    Calcium Carbonate (CALCIUM 500 OR) Take 2 tablets by mouth daily.     [2]   Allergies  Allergen Reactions    Duragesic [Fentanyl] ITCHING     Had rxn to adhesive at one point. Patient denies any reaction to Fentanyl, only to the adhesive

## 2025-02-11 NOTE — PROGRESS NOTES
[facility] Hospitalist Progress Note                                                                     [facility]   part of [facility]      Finn Carl  10/14/1940    SUBJECTIVE: no chest pain, palpitations, shortness of breath, cough. Pt with some abdominal pain and nausea with vomiting.     OBJECTIVE:  Temp:  [97.7 °F (36.5 °C)-99.1 °F (37.3 °C)] 98.9 °F (37.2 °C)  Pulse:  [] 97  Resp:  [10-25] 15  BP: (183)/(67) 183/67  SpO2:  [90 %-98 %] 96 %  AO: (124-163)/(51-71) 150/61  Exam  Gen: No acute distress, alert and oriented  Pulm: Lungs clear bilaterally, normal respiratory effort, no crackles, no wheezing  CV: Heart with regular rate and rhythm, no murmur.   Abd: Abdomen soft, generalized tenderness, nondistended, hypoactive BS  MSK:no significant pitting edema or tenderness of the LE  Skin: no new rashes or lesions    Labs:   Recent Labs   Lab 02/10/25  0655 02/10/25  1348 02/11/25  0408   WBC  --  12.1* 13.6*   HGB  --  10.9* 10.6*   MCV  --  87.2 85.0   PLT  --  157.0 174.0   INR 1.00  --  1.04       Recent Labs   Lab 02/10/25  1348 02/11/25  0408    140   K 5.6* 3.7    104   CO2 25.0 26.0   BUN 17 15   CREATSERUM 0.76 0.70   CA 8.6* 9.5   * 153*       Recent Labs   Lab 02/10/25  1348   ALT 11   AST 34*   ALB 4.2       No results for input(s): \"PGLU\" in the last 168 hours.    Meds:   Scheduled:    FLUoxetine HCl  40 mg Oral Daily    umeclidinium bromide  1 puff Inhalation Daily    fluticasone-salmeterol  1 puff Inhalation BID    heparin  5,000 Units Subcutaneous Q8H ANNE    ketorolac  15 mg Intravenous 4 times per day    lidocaine-menthol  1 patch Transdermal Daily    pantoprazole  40 mg Intravenous QAM AC    Or    pantoprazole  40 mg Oral QAM AC     Continuous Infusions:    lactated ringers 50 mL/hr at 02/11/25 0600    niCARdipine 5 mg/hr (02/11/25 0600)     PRN:   albuterol    acetaminophen **OR** HYDROcodone-acetaminophen **OR**  HYDROcodone-acetaminophen    ondansetron    metoclopramide    ASSESSMENT / PLAN:   84 year old female with history of anxiety, copd, cad, hld, pulmonary htn, osteoporosis presenting with 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   -vascular following  -cardene for BP control      Post Operative Pain Control  -toradol iv atc  -Lidocaine patch  -acetaminophen po prn  -norco po prn    Abdominal Pain  Nausea with vomiting  -ct abd ordered    HTN  -nicardipine drip as needed     Anxiety  -fluoxetine     COPD   -advair  -incruse  -pulmonary following    CAD hx  Chronic chest pain  -normal stress test in 2023  -no medications at this time  -cardiology following     Quality:  DVT Prophylaxis: scd, heparin sq  CODE status: DNR select per chart  Moreno:      Plan of care discussed with patient and staff     Dispo: no discharge     Ramses Serna MD  [facility] Hospitalist  [phone]    Addendum: ct abd shows colitis, surgery consulted by ICU, IV abx started. Due to CP, although chronic cardiology consulted.

## 2025-02-12 LAB
ANION GAP SERPL CALC-SCNC: 9 MMOL/L (ref 0–18)
APTT PPP: 70 SECONDS (ref 23–36)
BASOPHILS # BLD AUTO: 0.02 X10(3) UL (ref 0–0.2)
BASOPHILS # BLD AUTO: 0.05 X10(3) UL (ref 0–0.2)
BASOPHILS NFR BLD AUTO: 0.2 %
BASOPHILS NFR BLD AUTO: 0.5 %
BLOOD TYPE BARCODE: 5100
BUN BLD-MCNC: 15 MG/DL (ref 9–23)
CALCIUM BLD-MCNC: 9.3 MG/DL (ref 8.7–10.6)
CHLORIDE SERPL-SCNC: 103 MMOL/L (ref 98–112)
CO2 SERPL-SCNC: 28 MMOL/L (ref 21–32)
CREAT BLD-MCNC: 0.75 MG/DL
EGFRCR SERPLBLD CKD-EPI 2021: 79 ML/MIN/1.73M2 (ref 60–?)
EOSINOPHIL # BLD AUTO: 0.02 X10(3) UL (ref 0–0.7)
EOSINOPHIL # BLD AUTO: 0.09 X10(3) UL (ref 0–0.7)
EOSINOPHIL NFR BLD AUTO: 0.2 %
EOSINOPHIL NFR BLD AUTO: 0.8 %
ERYTHROCYTE [DISTWIDTH] IN BLOOD BY AUTOMATED COUNT: 13.8 %
ERYTHROCYTE [DISTWIDTH] IN BLOOD BY AUTOMATED COUNT: 14.1 %
GLUCOSE BLD-MCNC: 135 MG/DL (ref 70–99)
HCT VFR BLD AUTO: 31.7 %
HCT VFR BLD AUTO: 31.7 %
HGB BLD-MCNC: 10.2 G/DL
HGB BLD-MCNC: 10.3 G/DL
IMM GRANULOCYTES # BLD AUTO: 0.03 X10(3) UL (ref 0–1)
IMM GRANULOCYTES # BLD AUTO: 0.08 X10(3) UL (ref 0–1)
IMM GRANULOCYTES NFR BLD: 0.3 %
IMM GRANULOCYTES NFR BLD: 0.6 %
LYMPHOCYTES # BLD AUTO: 0.89 X10(3) UL (ref 1–4)
LYMPHOCYTES # BLD AUTO: 1.05 X10(3) UL (ref 1–4)
LYMPHOCYTES NFR BLD AUTO: 7.1 %
LYMPHOCYTES NFR BLD AUTO: 9.7 %
MAGNESIUM SERPL-MCNC: 1.8 MG/DL (ref 1.6–2.6)
MCH RBC QN AUTO: 28 PG (ref 26–34)
MCH RBC QN AUTO: 28.4 PG (ref 26–34)
MCHC RBC AUTO-ENTMCNC: 32.2 G/DL (ref 31–37)
MCHC RBC AUTO-ENTMCNC: 32.5 G/DL (ref 31–37)
MCV RBC AUTO: 86.1 FL
MCV RBC AUTO: 88.3 FL
MONOCYTES # BLD AUTO: 1.37 X10(3) UL (ref 0.1–1)
MONOCYTES # BLD AUTO: 1.39 X10(3) UL (ref 0.1–1)
MONOCYTES NFR BLD AUTO: 11 %
MONOCYTES NFR BLD AUTO: 12.6 %
NEUTROPHILS # BLD AUTO: 10.2 X10 (3) UL (ref 1.5–7.7)
NEUTROPHILS # BLD AUTO: 10.2 X10(3) UL (ref 1.5–7.7)
NEUTROPHILS # BLD AUTO: 8.26 X10 (3) UL (ref 1.5–7.7)
NEUTROPHILS # BLD AUTO: 8.26 X10(3) UL (ref 1.5–7.7)
NEUTROPHILS NFR BLD AUTO: 76.1 %
NEUTROPHILS NFR BLD AUTO: 80.9 %
OSMOLALITY SERPL CALC.SUM OF ELEC: 293 MOSM/KG (ref 275–295)
PLATELET # BLD AUTO: 142 10(3)UL (ref 150–450)
PLATELET # BLD AUTO: 158 10(3)UL (ref 150–450)
POTASSIUM SERPL-SCNC: 3.2 MMOL/L (ref 3.5–5.1)
POTASSIUM SERPL-SCNC: 3.2 MMOL/L (ref 3.5–5.1)
RBC # BLD AUTO: 3.59 X10(6)UL
RBC # BLD AUTO: 3.68 X10(6)UL
SODIUM SERPL-SCNC: 140 MMOL/L (ref 136–145)
UNIT VOLUME: 350 ML
WBC # BLD AUTO: 10.9 X10(3) UL (ref 4–11)
WBC # BLD AUTO: 12.6 X10(3) UL (ref 4–11)

## 2025-02-12 PROCEDURE — 99232 SBSQ HOSP IP/OBS MODERATE 35: CPT | Performed by: EMERGENCY MEDICINE

## 2025-02-12 RX ORDER — MAGNESIUM OXIDE 400 MG/1
400 TABLET ORAL ONCE
Status: COMPLETED | OUTPATIENT
Start: 2025-02-12 | End: 2025-02-12

## 2025-02-12 NOTE — DISCHARGE INSTRUCTIONS
Drink plenty of water, eat vegetables. You can take a stool softener to avoid constipation.     Wash groin access sites daily with soap and water. Call with any s/s of infection.    Return to the clinic in 2 weeks for a follow up visit.    Take plavix 75 mg daily for 90 days.

## 2025-02-12 NOTE — PROGRESS NOTES
Doing well this morning states that she not having any nausea has been having bowel movements and passing flatus.  States that she is tolerating the clear liquids we will advance her to full liquid diet for lunch today.    On exam she is awake alert oriented no distress her abdomen is soft nontender nondistended.  She does have bilateral incision sites in her groins which appear clean dry and intact mild ecchymosis around the left groin but it is minimal and small.    She had good pulses in bilateral lower extremities that are palpable.    Her labs today were demonstrating a sodium of 140 potassium 3.2 bicarb of 28 creatinine is 0.75.  Mag was 1.8 oral magnesium was given.    White blood cell count was 12 hemoglobin was 10.3 platelet count was 158,000 PTT was 70 follow-up CBC at 5 PM.    In summary 84-year-old female presents to us status post abdominal aortic aneurysm graft that did have some question of thickening of the transverse colon and descending colon doing well postoperatively.    Problems  Hypertension  COPD PD on 6 L of home O2  Pulmonary hypertension  Abdominal aortic aneurysm that was enlarging  Colitis  Acute on chronic hypoxemic respiratory failure  Diastolic heart failure  Plan  Neuro  Tolerating a diet  Advance as tolerated  No longer nauseous or vomiting tolerating clear liquids    Cardiovascular  Hypertension  She has been weaned off Cardene  She is on few other oral antihypertensives at this time.    Cardiology has followed  No meds needed for HTN    For his abdominal aortic aneurysm vascular surgery is following  Has been removed okay with transferring him to the floor bilateral groin sites are soft no large hematoma was present possible discharge tomorrow    Respiratory  And has a history of COPD she is on 6 L home O2 which she is currently on  She remains on her Advair and her Ellipta.    GI  She is tolerating a clear we will advance her to soft and hopefully to full diet this evening he is  having bowel movements not feeling nauseous and tolerating a diet well    Renal  Patient's creatinine remained stable urine output remains adequate no marked electrolyte abnormalities at this time    Infectious disease  There was concern about colitis but abdominal exam is benign is not had fevers she does not have leukocytosis she is tolerating a diet she is having bowel movements and therefore I would discontinue Zosyn    Endocrine  Blood sugars have been stable    Disposition transfer to the floor    Critical care attending    Date of service is February 12, 2025

## 2025-02-12 NOTE — PROGRESS NOTES
[facility]  Progress Note    Finn Carl Patient Status:  Inpatient    10/14/1940 MRN [id number]   Location [facility] 4SW-A Attending Jethro Monterroso MD   Hosp Day # 2 PCP Gisele Faulkner MD     Subjective:   Feels better today overall.  No chest pain to suggest angina.  No shortness of breath.  No new focal cardiovascular complaints reported.    Objective:  Vitals:    25 0500 25 0600 25 0700 25 0800   BP: 143/62 143/54 132/69 151/64   BP Location:       Pulse: 79 79 93 83   Resp:  20   Temp:       TempSrc:       SpO2: 91% 94% 96% 94%   Weight:       Height:           Temp (24hrs), Av.7 °F (37.1 °C), Min:98 °F (36.7 °C), Max:99.2 °F (37.3 °C)      Medications:   Scheduled:    potassium chloride  40 mEq Intravenous Once    magnesium oxide  400 mg Oral Once    piperacillin-tazobactam  3.375 g Intravenous Q8H    FLUoxetine HCl  40 mg Oral Daily    umeclidinium bromide  1 puff Inhalation Daily    fluticasone-salmeterol  1 puff Inhalation BID    heparin  5,000 Units Subcutaneous Q8H ANNE    lidocaine-menthol  1 patch Transdermal Daily    pantoprazole  40 mg Intravenous QAM AC    Or    pantoprazole  40 mg Oral QAM AC       Continuous Infusion:    lactated ringers Stopped (25 1140)    niCARdipine Stopped (25 0500)       PRN Medications:     albuterol    acetaminophen **OR** HYDROcodone-acetaminophen **OR** HYDROcodone-acetaminophen    ondansetron    metoclopramide    Intake/Output:     Intake/Output Summary (Last 24 hours) at 2025 0824  Last data filed at 2025 0656  Gross per 24 hour   Intake 1117.9 ml   Output 1025 ml   Net 92.9 ml       Wt Readings from Last 3 Encounters:   25 118 lb 6.2 oz (53.7 kg)   12/15/24 120 lb (54.4 kg)   24 123 lb (55.8 kg)       Allergies:  Allergies[1]    Physical Exam:   General:  Well-developed / Well-nourished.  No acute distress.  HEENT:  Normocephalic.  Atraumatic.  No icterus.  Neck:  There is no jugular  venous distention.   Cardiovascular:  Cardiovascular examination demonstrates a regular rate and rhythm.  There is normal S1, S2.  There is no S3 or S4.  There are no murmurs, rubs, or gallops.  No click is appreciated.  PMI is nondisplaced with a normal apical impulse.    Pulmonary:  Lungs are clear to auscultation bilaterally.  There are no focal rales, rhonchi, or wheezes.  Fair air movement is noted throughout both lung fields.   Abdomen:  The abdomen is soft, non-distended, and non-tender.  Bowel sounds are present and hypoactive.  No organomegaly is appreciated.  Extremities:  Extremities do not demonstrate any evidence of peripheral edema.   No cyanosis or clubbing of the digits is appreciated.  Neurologic:  Alert and oriented.  Normal affect.  Integument:  No visible rashes are appreciated.      Laboratory/Data:   Recent Labs   Lab 02/10/25  1348 02/11/25  0408 02/12/25  0542   * 153* 135*   BUN 17 15 15   CREATSERUM 0.76 0.70 0.75   CA 8.6* 9.5 9.3   ALB 4.2  --   --     140 140   K 5.6* 3.7 3.2*  3.2*    104 103   CO2 25.0 26.0 28.0   ALKPHO 80  --   --    AST 34*  --   --    ALT 11  --   --    BILT 0.3  --   --    TP 6.5  --   --        Recent Labs   Lab 02/10/25  1348 02/11/25  0408 02/12/25  0542   RBC 3.84 3.81 3.68*   HGB 10.9* 10.6* 10.3*   HCT 33.5* 32.4* 31.7*   MCV 87.2 85.0 86.1   MCH 28.4 27.8 28.0   MCHC 32.5 32.7 32.5   RDW 13.9 14.1 14.1   NEPRELIM  --   --  10.20*   WBC 12.1* 13.6* 12.6*   .0 174.0 158.0       Recent Labs   Lab 02/10/25  0655 02/11/25  0408   PTP 13.3 13.8   INR 1.00 1.04       Recent Labs   Lab 02/11/25  0931 02/11/25  1311   CK 49 54         Tele: SR    Assessment:     AAA  -S/P endovascular repair  2.     Resolved atypical chest pain                -R/O MI  3.     HTN resolved                -Reports that historically BP runs ion the lower end                -Off Nicardipine  4.     HL  5.     COPD                -On chronic home O2  6.    H/O  CAD  7.    H/O anxiety  8.    Pulmonary HTN  9.    ?Colitis    Plan:    Pain control   Tele    O2   COPD optimization  -Pulmonary following   6.    Monitor hemodynamics    Juan Alberto Durham MD  2/12/2025  8:24 AM         [1]   Allergies  Allergen Reactions    Duragesic [Fentanyl] ITCHING     Had rxn to adhesive at one point. Patient denies any reaction to Fentanyl, only to the adhesive

## 2025-02-12 NOTE — PROGRESS NOTES
[facility]   part of [facility]     Vascular Surgery Progress Note    Finn Carl Patient Status:  Inpatient    10/14/1940 MRN [id number]   Location [facility] 4SW-A Attending Jethro Monterroso MD   Hosp Day # 2 PCP Gisele Faulkner MD     Objective:   Temp: 99.2 °F (37.3 °C)  Pulse: 93  Resp: 25  BP: 132/69  AO: 179/176      Events Yesterday/Overnight:  Patient underwent a fenestrated endovascular aortic repair with right renal artery angioplasty and left external iliac artery angioplasty and stenting on 2/10 with Dr. Monterroso. CT abdomen/pelvis was completed  due to abdominal and chest pain. Imaging was reviewed. All visceral vessels are patent, no endoleak. Chest x-ray completed, emphysema/COPD, no significant changes. Patient's nausea and emesis has resolved. Pain has decreased. Patient is able to ambulate. Moreno removed yesterday, voiding well. Cardene discontinued this morning. /69. A-line has been removed.    Exam:  Palpable bilateral DP and PT pulses. Abdomen soft. Bilateral groin access sites are soft, no hematoma present.    Impression/Plan:    Patient to transfer to telemetry. Possible discharge tomorrow.    Physical therapy.    Up in the chair.      Medications:  Current Facility-Administered Medications   Medication Dose Route Frequency    potassium chloride 40 mEq in 250mL sodium chloride 0.9% IVPB premix  40 mEq Intravenous Once    magnesium oxide (Mag-Ox) tab 400 mg  400 mg Oral Once    piperacillin-tazobactam (Zosyn) 3.375 g in dextrose 5% 100 mL IVPB-ADDV  3.375 g Intravenous Q8H    albuterol (Ventolin HFA) 108 (90 Base) MCG/ACT inhaler 2 puff  2 puff Inhalation QID PRN    FLUoxetine (PROzac) cap 40 mg  40 mg Oral Daily    umeclidinium bromide (Incruse Ellipta) 62.5 MCG/ACT inhaler 1 puff  1 puff Inhalation Daily    fluticasone-salmeterol (Advair Diskus) 250-50 MCG/ACT inhaler 1 puff  1 puff Inhalation BID    acetaminophen (Tylenol) tab 650 mg  650 mg Oral Q4H PRN    Or     HYDROcodone-acetaminophen (Norco) 5-325 MG per tab 1 tablet  1 tablet Oral Q4H PRN    Or    HYDROcodone-acetaminophen (Norco) 5-325 MG per tab 2 tablet  2 tablet Oral Q4H PRN    ondansetron (Zofran) 4 MG/2ML injection 4 mg  4 mg Intravenous Q6H PRN    metoclopramide (Reglan) 5 mg/mL injection 5 mg  5 mg Intravenous Q8H PRN    lactated ringers infusion   Intravenous Continuous    heparin (Porcine) 5000 UNIT/ML injection 5,000 Units  5,000 Units Subcutaneous Q8H [facility]    lidocaine-menthol 4-1 % patch 1 patch  1 patch Transdermal Daily    niCARdipine in sodium chloride 0.86% (carDENE) 20 mg/200mL infusion premix  5-15 mg/hr Intravenous Continuous    pantoprazole (Protonix) 40 mg in sodium chloride 0.9% PF 10 mL IV push  40 mg Intravenous QAM AC    Or    pantoprazole (Protonix) DR tab 40 mg  40 mg Oral QAM AC       Laboratory/Data:    LABS:  Recent Labs   Lab 02/10/25  0655 02/10/25  1348 02/11/25  0408 02/12/25  0542   WBC  --  12.1* 13.6* 12.6*   HGB  --  10.9* 10.6* 10.3*   MCV  --  87.2 85.0 86.1   PLT  --  157.0 174.0 158.0   INR 1.00  --  1.04  --        Recent Labs   Lab 02/10/25  1348 02/11/25  0408 02/12/25  0542    140 140   K 5.6* 3.7 3.2*  3.2*    104 103   CO2 25.0 26.0 28.0   BUN 17 15 15   CREATSERUM 0.76 0.70 0.75   * 153* 135*   CA 8.6* 9.5 9.3   MG  --   --  1.8     Recent Labs   Lab 02/10/25  0655 02/11/25  0408   PTP 13.3 13.8   INR 1.00 1.04   PTT 33.2 29.3     Recent Labs   Lab 02/10/25  1348   ALT 11   AST 34*   ALB 4.2     No results for input(s): \"TROP\" in the last 168 hours.  No results found for: \"ANAS\", \"DANNA\", \"ANASCRN\"  No results for input(s): \"PCACT\", \"PSACT\", \"AT3ACT\", \"HIPAB\", \"PATHI\", \"STALA\", \"DRVVTRATIO\", \"DRVVT\", \"STACLOT\", \"CARIGG\", \"D0YQ9MFSCY\", \"N6MD3AYELL\", \"RA\", \"HAVIGM\", \"HBCIGM\", \"HCVAB\", \"HBSAG\", \"HBCAB\", \"HBVDNAINTERP\", \"ANAS\", \"C3\", \"C4\" in the last 168 hours.    RANJIT Bradley  2/12/2025  7:42 AM

## 2025-02-12 NOTE — PLAN OF CARE
Assumed care of pt after RN report. Alert and oriented. Neuro intact. VSS. 6L O2 per home baseline. Chronic back pain, norco given, see MAR. Bilateral groin sites C/D/I. Pulses all palpable. Tolerating diet, denies any nausea, passing gas. Ambulating in halls with PT/OT. See flowsheets for full assessments. POC discussed with pt. Transferring to CTU8, report given. Pt states she will call daughter with updated room number.

## 2025-02-12 NOTE — PHYSICAL THERAPY NOTE
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      Patient is requiring contact guard assist and minimal assist as a result of the following impairments: decreased functional strength, decreased muscular endurance, and increased O2 needs from baseline.     Patient continues to function below baseline with transfers, gait, stair negotiation, and standing prolonged periods.  Next session anticipate patient to progress transfers and gait.  Physical Therapy will continue to follow patient for duration of hospitalization.    Patient continues to benefit from continued skilled PT services: for duration of hospitalization, however, given the patient is functioning near baseline level do not anticipate skilled therapy needs at discharge .    PLAN DURING HOSPITALIZATION  Nursing Mobility Recommendation : 1 Assist     PT Treatment Plan: Bed mobility;Endurance;Patient education;Family education;Gait training;Neuromuscular re-educate;Strengthening;Stair training;Transfer training;Balance training  Frequency (Obs): 3-5x/week     CURRENT GOALS       Goal #1 Patient is able to demonstrate supine - sit EOB @ level: modified independent      Goal #2 Patient is able to demonstrate transfers Sit to/from Stand at assistance level: supervision      Goal #3 Patient is able to ambulate 150 feet with assist device: none at assistance level: supervision      Goal #4     Goal #5     Goal #6     Goal Comments: Goals established on 2/11/2025 2/12/2025 all goals ongoing     SUBJECTIVE  \"His name is Sir Jenniferving Jose Antonio, but we just call him Mckinley\" referring to  her dog - pt also has 2 cats     OBJECTIVE  Precautions: Bed/chair alarm    WEIGHT BEARING RESTRICTION     PAIN ASSESSMENT   Ratin  Location: pt denies pain  Management Techniques: Activity promotion;Repositioning    BALANCE                                                                                                                       Static Sitting: Fair +  Dynamic Sitting: Fair +           Static Standing: Fair -  Dynamic Standing: Poor +    ACTIVITY TOLERANCE                         O2 WALK       AM-PAC '6-Clicks' INPATIENT SHORT FORM - BASIC MOBILITY  How much difficulty does the patient currently have...  Patient Difficulty: Turning over in bed (including adjusting bedclothes, sheets and blankets)?: A Little   Patient Difficulty: Sitting down on and standing up from a chair with arms (e.g., wheelchair, bedside commode, etc.): A Little   Patient Difficulty: Moving from lying on back to sitting on the side of the bed?: A Little   How much help from another person does the patient currently need...   Help from Another: Moving to and from a bed to a chair (including a wheelchair)?: A Little   Help from Another: Need to walk in hospital room?: A Little   Help from Another: Climbing 3-5 steps with a railing?: A Little     AM-PAC Score:  Raw Score: 18   Approx Degree of Impairment: 46.58%   Standardized Score (AM-PAC Scale): 43.63   CMS Modifier (G-Code): CK    FUNCTIONAL ABILITY STATUS  Gait Assessment   Functional Mobility/Gait Assessment  Gait Assistance: Contact guard assist  Distance (ft): 125  Assistive Device: Rolling walker  Pattern: Within Functional Limits    Skilled Therapy Provided  Pt presents in bed- therapist educated pt on sequencing, EC and safe mobility c RW  Pt on O2, sats monitored, pt denies SOB   Pt requested to return to bed   Bed Mobility:  Rolling: nt    Supine<>Sit: min A    Sit<>Supine: supervision      Transfer Mobility:  Sit<>Stand: CGA    Stand<>Sit: CGA    Gait:min A c RW      Therapist's Comments: sats WNL on O2       THERAPEUTIC EXERCISES  Lower Extremity Alternating marching  Ankle pumps  Knee extension  LAQ     Upper Extremity      Position      Repetitions      Sets        Patient End of Session: In bed;Needs met;Call light within reach;RN aware of session/findings;All patient questions and concerns addressed;Hospital anti-slip socks;Alarm set    PT Session Time: 28 minutes  Gait Trainin minutes  Therapeutic Activity: 14 minutes  Therapeutic Exercise:  minutes   Neuromuscular Re-education:  minutes

## 2025-02-12 NOTE — PROGRESS NOTES
[facility]  Progress Note    Finn Carl Patient Status:  Inpatient    10/14/1940 MRN [id number]   Location [facility] 4SW-A Attending Jethro Monterroso MD   Hosp Day # 2 PCP Gisele Faulkner MD     Subjective:    Patient tolerating clear liquids,no further nausea  Denies any abdominal pain     Objective/Physical Exam:    Vital Signs:  Blood pressure 151/64, pulse 83, temperature 98.8 °F (37.1 °C), resp. rate 20, height 5' 4\" (1.626 m), weight 118 lb 6.2 oz (53.7 kg), SpO2 94%.    General:  Alert, orientated x3.  Cooperative.  No apparent distress.    Lungs:  Non labored breathing    Cardiac:  Regular rate and rhythm.     Abdomen:  soft, non tender, non distended    Extremities:  No lower extremity edema noted.  Without clubbing or cyanosis.        Labs:  Reviewed    Lab Results   Component Value Date    WBC 12.6 2025    HGB 10.3 2025    HCT 31.7 2025    .0 2025    CREATSERUM 0.75 2025    BUN 15 2025     2025    K 3.2 2025    K 3.2 2025     2025    CO2 28.0 2025     2025    CA 9.3 2025    PTT 70.0 2025    MG 1.8 2025    CK 54 2025       Xray:  Reviewed    Problem List:  Patient Active Problem List   Diagnosis    Postlaminectomy syndrome, lumbar region    Long-term current use of opiate analgesic    Disc displacement, lumbar    Left lumbar radiculitis    Cervical radiculitis    S/P cervical spinal fusion    Spondylosis of cervical region without myelopathy or radiculopathy    DDD (degenerative disc disease), cervical    Cervical spinal stenosis    SOB (shortness of breath)    Abdominal aortic aneurysm (AAA) without rupture    Pulmonary hypertension (HCC)    Chronic obstructive pulmonary disease, unspecified COPD type (HCC)    Localized edema    Chronic diastolic (congestive) heart failure (HCC)    Medicare annual wellness visit, subsequent    Post-menopausal    Dysthymia    Impaired  fasting glucose    Sinus headache    Acute maxillary sinusitis, recurrence not specified    Hypoxia    Elevated blood pressure reading without diagnosis of hypertension    Venous stasis ulcer of other part of right lower leg limited to breakdown of skin with varicose veins (HCC)    Right hip pain    Dyspnea, unspecified type    Pneumonia of both lower lobes due to infectious organism    Acute pain of right shoulder    Pneumonia due to infectious organism    Juxtarenal abdominal aortic aneurysm (AAA) without rupture           Impression:     83 y/o S/P endoscopic AAA graft with thickening of ascending and transverse colon, question of ischemia?Colitis?   Denies any abdominal pain, tolerating clear liquids    Plan:    Full liquids and advance as tolerated  No plans for acute surgical management  All questions answered    HEAVEN Irvin  [facility]  General Surgery  2/12/2025

## 2025-02-12 NOTE — PROGRESS NOTES
Received report regarding patient at change of shift. Patient A&O x4 and follows commands. Cardene on/off, but off by 5am. Patient able to ambulate to bathroom with standby assist. A-line removed because no longer working. PRN pain meds given for back pain as ordered. Please see MAR/flowsheets for further data.

## 2025-02-12 NOTE — OCCUPATIONAL THERAPY NOTE
OCCUPATIONAL THERAPY TREATMENT NOTE - INPATIENT     Room Number: 465/465-A  Session: 1   Number of Visits to Meet Established Goals: 2    Presenting Problem: AAA repair    HOME SITUATION  Type of Home: House  Home Layout: Multi-level;Able to live on main level  Lives With: Son  Toilet and Equipment: Standard height toilet  Shower/Tub and Equipment: Walk-in shower;Shower chair;Grab bar  Occupation/Status: retired  Drives: Yes  Patient Regularly Uses: Home O2;Other (Comment) (1 to 6L home O2)  Prior Level of Function: Independent with mobility, self care, driving, light IADLs. Lives with son who is caregiver for patient and able to assist as needed.       ASSESSMENT   Patient demonstrates good  progress this session, goals updated to reflect patient performance.    Patient continues to function near baseline with toileting, lower body dressing, grooming, bed mobility, transfers, static standing balance, dynamic standing balance, functional standing tolerance, and energy conservation strategies.   Contributing factors to remaining limitations include decreased endurance, pain, and impaired standing balance.    Patient continues to benefit from continued skilled OT services: with no additional skilled services but increased support at home.     History:   Patient is a 84 year old female admitted on 2/10/2025 with Presenting Problem: AAA repair. Co-Morbidities : chronic back pain, post-laminectomy syndrome, COPD, HF, CAD, HTN    WEIGHT BEARING RESTRICTION       Recommendations for nursing staff:   Transfers: 1 person, RW  Toileting location: bathroom     TREATMENT SESSION:  Patient Start of Session: supine    FUNCTIONAL TRANSFER ASSESSMENT  Sit to Stand: Edge of Bed  Edge of Bed: Supervision  Toilet Transfer: Supervision    BED MOBILITY  Supine to Sit : Modified Independent  Sit to Supine (OT): Modified Independent  Scooting: independent    BALANCE ASSESSMENT  Static Sitting: Supervision  Static Standing:  Supervision    FUNCTIONAL ADL ASSESSMENT  Grooming Standing: Supervision  LB Dressing Seated: Minimal Assist  LB Dressing Standing: Stand-by Assist  Toileting Seated: Independent  Toileting Standing: Supervision    ACTIVITY TOLERANCE: Stable on 6L O2                         O2 SATURATIONS       EDUCATION PROVIDED  Patient Education : Plan of Care; Energy Conservation  Patient's Response to Education: Verbalized Understanding    Equipment used: rw, gait belt  Demonstrates functional use, Would benefit from additional trial no, goals met     Exercises:    Exercises Repetitions Comments   Scapular elevation     Scapular retraction     Shoulder rolls     Shoulder flexion     Shoulder abduction     Shoulder internal/external rotation     Forward punch     Elbow flexion     Elbow extension     Forearm pronation/supination     Wrist flexion/extension     Gross grasp/fist pumps     Ankle pumps     Knee extension     Marching         Therapist comments:   Patient demonstrated supervision to modified independent with lower body dressing task, bed mobility, toileting, and hand hygiene. Good tolerance for functional mobility around unit for at least 200 feet on 6L. Patient was able to describe energy conservation strategies that she can incorporate into ADL and IADLs at home. Patient reported she will hire a cleaning service. Discussed patient's concern about her endurance as a barrier to managing at home. Patient aware that she is improving rapidly and that her vascular surgery \"is a piece of cake, its my legs and my back that are the problem\" . Patient reported that her daughter is also able to stay with her if needed.     Patient End of Session: All patient questions and concerns addressed;RN aware of session/findings;Call light within reach;Needs met;In bed    SUBJECTIVE  Pleasant, participatory  \"I really don't think I need it\"  ( Home health therapy )    PAIN ASSESSMENT  Rating: Unable to rate  Location: low  back  Management Techniques: Nurse notified;Repositioning;Relaxation     OBJECTIVE  Precautions: Bed/chair alarm    AM-PAC ‘6-Clicks’ Inpatient Daily Activity Short Form  -   Putting on and taking off regular lower body clothing?: A Little  -   Bathing (including washing, rinsing, drying)?: A Little  -   Toileting, which includes using toilet, bedpan or urinal? : None  -   Putting on and taking off regular upper body clothing?: None  -   Taking care of personal grooming such as brushing teeth?: None  -   Eating meals?: None    AM-PAC Score:  Score: 22  Approx Degree of Impairment: 25.8%  Standardized Score (AM-PAC Scale): 47.1    PLAN  OT Device Recommendations: None  OT Treatment Plan: Endurance training;Functional transfer training;Energy conservation/work simplification techniques;Neuromuscluar reeducation;Equipment eval/education;Patient/Family training;Compensatory technique education  Rehab Potential : Good  Frequency: 3-5x/week    OT Goals: MET 2/12/25  ADL Goals   Patient will perform grooming: with modified independent  MET 2/12  Patient will perform lower body dressing:  with modified independent  Patient will perform toileting: with modified independent  MET 2/12     Functional Transfer Goals  Patient will transfer to toilet:  with modified independent  MET 2/12     Additional Goals  Patient will describe energy conservation techniques she can use for I/ADLs  MET 2/12    OT Session Time: 23 minutes  Self-Care Home Management: 10 minutes  Therapeutic Activity: 13 minutes

## 2025-02-12 NOTE — PROGRESS NOTES
[facility]    Finn Carl Patient Status:  Inpatient    10/14/1940 MRN [id number]   Location [facility] 4SW-A Attending Jethro Monterroso MD   Hosp Day # 2 PCP Gisele Faulkner MD     SUBJECTIVE: No complaints    OBJECTIVE:  /69 (BP Location: Right arm)   Pulse 88   Temp 98.9 °F (37.2 °C) (Temporal)   Resp 19   Ht 5' 4\" (1.626 m)   Wt 118 lb 6.2 oz (53.7 kg)   SpO2 97%   BMI 20.32 kg/m²   O2 requirement: 5L     I/O last 3 completed shifts:  In: 2600.4 [I.V.:2050.4; IV PIGGYBACK:550]  Out: 3075 [Urine:3075]  I/O this shift:  In: 63.4 [I.V.:63.4]  Out: -      Current Medications:   Current Facility-Administered Medications:     albuterol (Ventolin HFA) 108 (90 Base) MCG/ACT inhaler 2 puff, 2 puff, Inhalation, QID PRN    FLUoxetine (PROzac) cap 40 mg, 40 mg, Oral, Daily    umeclidinium bromide (Incruse Ellipta) 62.5 MCG/ACT inhaler 1 puff, 1 puff, Inhalation, Daily    fluticasone-salmeterol (Advair Diskus) 250-50 MCG/ACT inhaler 1 puff, 1 puff, Inhalation, BID    acetaminophen (Tylenol) tab 650 mg, 650 mg, Oral, Q4H PRN **OR** HYDROcodone-acetaminophen (Norco) 5-325 MG per tab 1 tablet, 1 tablet, Oral, Q4H PRN **OR** HYDROcodone-acetaminophen (Norco) 5-325 MG per tab 2 tablet, 2 tablet, Oral, Q4H PRN    ondansetron (Zofran) 4 MG/2ML injection 4 mg, 4 mg, Intravenous, Q6H PRN    metoclopramide (Reglan) 5 mg/mL injection 5 mg, 5 mg, Intravenous, Q8H PRN    lactated ringers infusion, , Intravenous, Continuous    heparin (Porcine) 5000 UNIT/ML injection 5,000 Units, 5,000 Units, Subcutaneous, Q8H ANNE    lidocaine-menthol 4-1 % patch 1 patch, 1 patch, Transdermal, Daily    pantoprazole (Protonix) 40 mg in sodium chloride 0.9% PF 10 mL IV push, 40 mg, Intravenous, QAM AC **OR** pantoprazole (Protonix) DR tab 40 mg, 40 mg, Oral, QAM AC      Physical Exam:                          General: alert, cooperative, in NAD                          HEENT: oropharynx clear without erythema or exudates, moist  mucous membranes                          Lungs: Clear to auscultation bilaterally, no wheezes or crackles                           Chest wall: No tenderness or deformity.                          Heart: Regular rate and rhythm, normal S1S2                          Abdomen: soft, non-tender, non-distended, positive BS.                          Extremity: No clubbing or cyanosis. no edema                          Skin: No rashes or lesions.       Lab Results   Component Value Date    WBC 12.6 02/12/2025    RBC 3.68 02/12/2025    HGB 10.3 02/12/2025    HCT 31.7 02/12/2025    MCV 86.1 02/12/2025    MCH 28.0 02/12/2025    MCHC 32.5 02/12/2025    RDW 14.1 02/12/2025    .0 02/12/2025     Lab Results   Component Value Date     02/12/2025    K 3.2 02/12/2025    K 3.2 02/12/2025     02/12/2025    CO2 28.0 02/12/2025    BUN 15 02/12/2025    CREATSERUM 0.75 02/12/2025     02/12/2025    CA 9.3 02/12/2025     Lab Results   Component Value Date    INR 1.04 02/11/2025    INR 1.00 02/10/2025    INR 1.04 10/04/2023          Imaging: I have independently visualized all relevant chest imaging in PACS.  I agree with the radiology interpretation except where noted.    PFT 2/2024. FVC 2.67 L (117%) , FEV1 1.11 L (64%) , ratio 42% , %, % , DLCO 22% , DLCO/VA 23%    6MWT 11/2023: 1 L at rest, 6 L with activity      ASSESSMENT/PLAN:  Acute on chronic hypoxemic respiratory failure: suspect atelectasis in the post operative period  -per last SST needs 1L at rest and 6L with activity, pt states that she has been using 6L all the time - currently on 6L at rest  -CXR without focal infiltrate.  Lung images on CT a/p with LLL consolidation with air bronchograms, slightly increased from 12/2024.  Currently monitoring off abx.   -encourage IS   -wean O2 as able  COPD/emphysema: no evidence of exacerbation  -OP PFTs with mild obstruction with severe decrease in diffusion capacity  -advair/incruse for home  trelegy  -BD protocol  -previously worked up for endobronchial valves as OP, deemed not a candidate  Pulm HTN: RVSP: 39mmHg on echo in 2/2023  -maintain euvolemia   Enlarging abdominal aortic aneurysm  -s/p endovascular AAA stent graft repair with fenestrated device 2/10 with Dr. Monterroso  -BP management per vascular surgery   -pain control  Colitis  -surgery eval appreciated  Dispo:  -DNAR/select  -will follow   -discussed with family at bedside

## 2025-02-12 NOTE — OPERATIVE REPORT
Pre-Operative Diagnosis:   1. Juxtarenal aortic aneurysm  2. Hypertension  3. COPD  4. Tobacco abuse     Post-Operative Diagnosis:   1. Juxtarenal aortic aneurysm  2. Hypertension  3. COPD  4. Tobacco abuse     Procedure Performed:   1. Ultrasound Guided Acces of Bilateral common femoral arteries.  2. Aortogram and pelvic angiogram with radiologic supervision interpretation   3.  Selective catheterization to first order branches of the aorta ( SMA, Right renal, Left renal )   4. Angioplasty of Right renal artery high grade stenosis with 6mm balloon.  5. Fenestrated endovascular aortic repair with placement of Cook Zfen 24x109 Endograft with 3 fenestrations (SMA, right renal, left renal ) CPT 87085  SMA 7x22 iCast    Right renal 6x38 iCast  Left  renal 7x22 iCast  Right Iliac Contra ZSLE 11x74  Left Iliac Ipsi  ZSLE 13x56  6. Intravascular Ultrasound of Left external iliac, Left common iliac, Right external iliac, right common iliac, and Aorta  7. Angioplasty and stenting of left external iliac with 8mm ev3 stent post dilated with 6mm balloon.  8. Closure of bilateral groin with proglide perclose suture ( 20 Fr Left, 18 Fr Right)           Surgeon:  Jethro Monterroso MD     Co-Surgeon:  Dick Freitas MD     Due to the complexity of the case with the extent of the aneurysm and her medical complexity and comorbidities, Dr. Monterroso requested I participate in this operation as a co-surgeon.     Anesthesia:   General        EBL: 200cc     Complications: None     Drains: None     Findings: At completion no endoleak with preservation of flow in all visceral vessels.     Disposition: Extubated and taken to the ICU in stable condition.  She will lay flat for 2 hours and be monitored closely.  At completion of the procedure he was noted to have strong doppler signal consistent with preoperative baseline in the bilateral lower extremities     Indications for procedure: This is an 84-year-old female with numerous comorbidities  with juxtarenal aortic aneurysm. On careful review it was determined that her options were open repair or attempt at endovascular repair.  She was an excellent candidate for a zenith fenestrated. We discussed the risks and benefits of the procedure not limited to risk of bleeding, infection, stroke, myocardial infarction, renal insufficiency, spinal cord ischemia, death.  She affirmed his understanding and strongly desired to proceed.     On the morning of 02/10/2025, the patient was brought into the operating room and placed in supine position.  General anesthesia was induced without any issues.  A Moreno catheter and arterial line were placed in sterile fashion without any issues.  Ancef was given for surgical antimicrobial prophylaxis.  The patient was subsequently prepped and draped in the usual sterile fashion.  Timeout was performed.     With the use of ultrasound, the femoral bifucation was identified over the femoral head. I utilized a micropuncture to access the left common femoral artery with placement of a micro wire and exchanged for a J wire and subsequent 6 Libyan sheath. Dr. Monterroso performed this on the right. This was then predilated with 8 Libyan sheath and then two pro-glide Perclose sutures were placed in the 10 and 2 o'clock position in standard fashion bilaterally with placement of 8 Libyan sheaths bilaterally. Dr. Monterroso performed this on the right and I performed this on the left.      We then utilized a Tierney 4 catheter and 018 Glidewire advantage to engage and select the right renal which was done with careful manipulation by Dr. Monterroso.  He confirmed this with contrast injection and then exchanged for a 6 mm balloon and performed angioplasty of the right renal high-grade stenosis which appeared to resolve without issue with deployment of the balloon.     I then exchanged for 8 mm balloon and performed angioplasty of the left common and external iliac arteries with 8 mm balloon due to  the high-grade stenoses and then exchanged for a 20 Thai sheath that was advanced through the left groin into the infrarenal aorta.      At this point we proceeded with the fenestrated repair. In the left groin I  exchanged for a Lunderquist wire and then performed intravascular ultrasound of the left external iliac, left common iliac, and aorta that was used to determine and verify the locations of the celiac, SMA and right and left renal arteries.  I then utilized a 20 Thai sheath with the endograft and advanced this through the left iliac into the aorta.     I then unsheathed it in to the visceral aorta. Dr. Monterroso then selected the graft from the right groin and advanced an 18 Fr sheath up the right groin into the gate and main body of the graft.  Dr. Monterroso then utilized a 99Presentsshee4 catheter to select the SMA, which was confirmed via contrast angiography and placement of a Hylton wire.  Dr. Monterroso then cannulated the right renal artery with placement of Hylton wire. I then selected the left renal with placement of a Hylton wire. Dr. Monterroso then placed a 6Fr sheath in the left renal with 7x22 iCast and Dr. Monterroso placed one in the right renal with 6x38 iCast. The main body was then deployed by Dr. Monterroso and then the proximal edge was ballooned with a CODA balloon by me  The cannula was removed. I then maintained the balloon for a back board.     I then withdrew the sheath in the left renal partially and then Dr. Monterroso deployed the left renal stent. This was then post dilated with a [id number] cook balloon. Repeat angiogram revealed wide patency no kink or stenosis. The wire and sheath were removed.   Dr. Monterroso then withdrew the sheath and deployed right right renal stent without issue. He then flaired this with 9mm balloon. Contrast injection confirmed wide patency with no leak. The wire and sheath were removed. We then placed a 7Fr sheath with 7x22 iCast stent in the SMA. Dr. Monterroso inflated  the balloon and deployed the SMA stent without any issues. I then flared the proximal segment with a 9 mm balloon without any issues. Angiogram revealed wide patency with no leak past this and thus the wire and sheath were removed.      I then advanced the bifurcated device up the left groin and deployed this below the renal arteries with exposure of the gate. Dr. Monterroso then used a kumpe and glidewire advantage to the select the gate.     Dr. Monterroso then exchanged for the intravascular ultrasound in the right common iliac, right external iliac to identify the iliac bifurcation. Dr. Monterroso then placed an 11mm right iliac limb and deployed this into the distal right common iliac artery with out issue. I then extended into the left common iliac without issue.   Dr. Monterroso then utilized a MOAB balloon to balloon the proximal, distal and overlapping zones. Dr. Monterroso then exchanged for a pigtail cathter and performed an angiogram that revealed wide patency of the celiac, SMA, and renal arteries with wide patency of the iliacs. There was no endoleak at completion with complete seal of the aneurysm.  There was a high-grade stenosis still within the left external iliac and I performed angioplasty and stenting with an 8 mm EV 3 stent and postdilated this with 7 mm balloon.  There was no stenosis within the right.  At this point we were satisfied and opted to terminate the procedure.  All catheters were removed.  The pro-glide Perclose sutures were then cinched with successful closure in the bilateral groins and the sutures were then cinched and cut thereby closing the arteriotomy.  Protamine was used for reversal of heparinization. Dermabond was applied.  Doppler signal was confirmed in the bilateral lower extremities consistent with preoperative baseline.  The patient awoke from general anesthesia was extubated and taken to the ICU in stable condition.  All counts were correct at the end of the case.

## 2025-02-12 NOTE — PROGRESS NOTES
[facility] Hospitalist Progress Note                                                                     [facility]   part of [facility]      Finn Carl  10/14/1940    SUBJECTIVE: no chest pain, palpitations, shortness of breath, cough. Pt abdominal pain, nausea and vomiting resolved. Feeling better     OBJECTIVE:  Temp:  [98 °F (36.7 °C)-99.2 °F (37.3 °C)] 98.8 °F (37.1 °C)  Pulse:  [] 83  Resp:  [14-30] 20  BP: (132-171)/(54-72) 151/64  SpO2:  [89 %-97 %] 94 %  AO: (115-187)/() 179/176  Exam  Gen: No acute distress, alert and oriented  Pulm: Lungs clear bilaterally, normal respiratory effort, no crackles, no wheezing  CV: Heart with regular rate and rhythm, no murmur.   Abd: Abdomen soft, no tenderness, nondistended, active BS  MSK:no significant pitting edema or tenderness of the LE  Skin: no new rashes or lesions    Labs:   Recent Labs   Lab 02/10/25  0655 02/10/25  1348 02/11/25  0408 02/12/25  0542   WBC  --  12.1* 13.6* 12.6*   HGB  --  10.9* 10.6* 10.3*   MCV  --  87.2 85.0 86.1   PLT  --  157.0 174.0 158.0   INR 1.00  --  1.04  --        Recent Labs   Lab 02/10/25  1348 02/11/25  0408 02/12/25  0542    140 140   K 5.6* 3.7 3.2*  3.2*    104 103   CO2 25.0 26.0 28.0   BUN 17 15 15   CREATSERUM 0.76 0.70 0.75   CA 8.6* 9.5 9.3   MG  --   --  1.8   * 153* 135*       Recent Labs   Lab 02/10/25  1348   ALT 11   AST 34*   ALB 4.2       No results for input(s): \"PGLU\" in the last 168 hours.    Meds:   Scheduled:    potassium chloride  40 mEq Intravenous Once    piperacillin-tazobactam  3.375 g Intravenous Q8H    FLUoxetine HCl  40 mg Oral Daily    umeclidinium bromide  1 puff Inhalation Daily    fluticasone-salmeterol  1 puff Inhalation BID    heparin  5,000 Units Subcutaneous Q8H ANNE    lidocaine-menthol  1 patch Transdermal Daily    pantoprazole  40 mg Intravenous QAM AC    Or    pantoprazole  40 mg Oral QAM AC     Continuous  Infusions:    lactated ringers Stopped (02/11/25 1140)    niCARdipine Stopped (02/12/25 0500)     PRN:   albuterol    acetaminophen **OR** HYDROcodone-acetaminophen **OR** HYDROcodone-acetaminophen    ondansetron    metoclopramide    ASSESSMENT / PLAN:   84 year old female with history of anxiety, copd, cad, hld, pulmonary htn, osteoporosis presenting with 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      Juxtarenal abdominal aortic aneurysm without rupture, right renal artery stenosis, left iliac stenosis  -POD # 2 s/p right renal artery angioplasty of renal artery stenosis, fenestrated endovascular aortic repair with 3 fenestrations, left external iliac artery angioplasty and stenting with bare metal stent.   -vascular following  -cardene for BP control      Post Operative Pain Control  -toradol iv atc  -Lidocaine patch  -acetaminophen po prn  -norco po prn    Abdominal Pain  Nausea with vomiting  -ct abd ordered--> colitis--> surgery consulted--> iv abx, clears--> follow rec today     HTN  -sbp stable  -nicardipine drip as needed     Anxiety  -fluoxetine     COPD   -advair  -incruse  -pulmonary following    CAD hx  Chronic chest pain  -normal stress test in 2023  -no medications at this time  -cardiology following     Quality:  DVT Prophylaxis: scd, heparin sq  CODE status: DNR select per chart  Moreno:      Plan of care discussed with patient and staff     Dispo: no discharge     Ramses Serna MD  [facility] Hospitalist  [phone]    Addendum: no fevers, no leukocytosis, abdominal pain better. IV abx stopped, monitor pt off abx.

## 2025-02-12 NOTE — PROGRESS NOTES
Patient having nausea and vomiting this evening. On nicardipine gtt for SBP goal 100-160. Complaining of chest pain and back pain. EKG and troponin done and reviewed. Spoke with vascular surgeon, will continue scheduled IV toradol and PRN antiemetics. Patient states symptoms have improved. Will continue to monitor    SAVANNA Ramsay-BC  Critical Care APRN  [id number]

## 2025-02-13 VITALS
DIASTOLIC BLOOD PRESSURE: 61 MMHG | OXYGEN SATURATION: 97 % | WEIGHT: 117.31 LBS | RESPIRATION RATE: 17 BRPM | HEIGHT: 64 IN | BODY MASS INDEX: 20.03 KG/M2 | HEART RATE: 89 BPM | TEMPERATURE: 98 F | SYSTOLIC BLOOD PRESSURE: 126 MMHG

## 2025-02-13 LAB
ANION GAP SERPL CALC-SCNC: 8 MMOL/L (ref 0–18)
BUN BLD-MCNC: 17 MG/DL (ref 9–23)
CALCIUM BLD-MCNC: 9.2 MG/DL (ref 8.7–10.6)
CHLORIDE SERPL-SCNC: 104 MMOL/L (ref 98–112)
CO2 SERPL-SCNC: 28 MMOL/L (ref 21–32)
CREAT BLD-MCNC: 0.68 MG/DL
EGFRCR SERPLBLD CKD-EPI 2021: 86 ML/MIN/1.73M2 (ref 60–?)
GLUCOSE BLD-MCNC: 107 MG/DL (ref 70–99)
MAGNESIUM SERPL-MCNC: 1.8 MG/DL (ref 1.6–2.6)
OSMOLALITY SERPL CALC.SUM OF ELEC: 292 MOSM/KG (ref 275–295)
POTASSIUM SERPL-SCNC: 3.7 MMOL/L (ref 3.5–5.1)
POTASSIUM SERPL-SCNC: 3.7 MMOL/L (ref 3.5–5.1)
SODIUM SERPL-SCNC: 140 MMOL/L (ref 136–145)

## 2025-02-13 RX ORDER — MAGNESIUM OXIDE 400 MG/1
400 TABLET ORAL ONCE
Status: COMPLETED | OUTPATIENT
Start: 2025-02-13 | End: 2025-02-13

## 2025-02-13 RX ORDER — CLOPIDOGREL BISULFATE 75 MG/1
75 TABLET ORAL DAILY
Qty: 90 TABLET | Refills: 0 | Status: SHIPPED | OUTPATIENT
Start: 2025-02-13

## 2025-02-13 RX ORDER — POTASSIUM CHLORIDE 1500 MG/1
40 TABLET, EXTENDED RELEASE ORAL ONCE
Status: COMPLETED | OUTPATIENT
Start: 2025-02-13 | End: 2025-02-13

## 2025-02-13 NOTE — PLAN OF CARE
Received patient from ICU before 1900. Skin check done with Tejal WILKES.  Patient alert and oriented x4. Tele Rhythm NSR. O2 sats on 6L NC.  Bed is locked and low position. Call light & personal belongings within reach. Denies chest pain at this time. Patient voiding WNL. Patient tolerating ambulation well.  Reviewed plan of care with patient and verbalized understanding.

## 2025-02-13 NOTE — PROGRESS NOTES
[facility]    iFnn Carl Patient Status:  Inpatient    10/14/1940 MRN [id number]   Location [facility] 8NE-A Attending Jethro Monterroso MD   Hosp Day # 3 PCP Gisele Faulkner MD     SUBJECTIVE: Pt states that she feels well, no further N/V.  No SOB.    OBJECTIVE:  /74 (BP Location: Left arm)   Pulse 89   Temp 98 °F (36.7 °C) (Oral)   Resp 16   Ht 64\"   Wt 117 lb 4.6 oz (53.2 kg)   SpO2 96%   BMI 20.13 kg/m²   O2 requirement: 5L     I/O last 3 completed shifts:  In: 873.4 [P.O.:360; I.V.:63.4; IV PIGGYBACK:450]  Out: 0   No intake/output data recorded.     Current Medications:   Current Facility-Administered Medications:     albuterol (Ventolin HFA) 108 (90 Base) MCG/ACT inhaler 2 puff, 2 puff, Inhalation, QID PRN    FLUoxetine (PROzac) cap 40 mg, 40 mg, Oral, Daily    umeclidinium bromide (Incruse Ellipta) 62.5 MCG/ACT inhaler 1 puff, 1 puff, Inhalation, Daily    fluticasone-salmeterol (Advair Diskus) 250-50 MCG/ACT inhaler 1 puff, 1 puff, Inhalation, BID    acetaminophen (Tylenol) tab 650 mg, 650 mg, Oral, Q4H PRN **OR** HYDROcodone-acetaminophen (Norco) 5-325 MG per tab 1 tablet, 1 tablet, Oral, Q4H PRN **OR** HYDROcodone-acetaminophen (Norco) 5-325 MG per tab 2 tablet, 2 tablet, Oral, Q4H PRN    ondansetron (Zofran) 4 MG/2ML injection 4 mg, 4 mg, Intravenous, Q6H PRN    metoclopramide (Reglan) 5 mg/mL injection 5 mg, 5 mg, Intravenous, Q8H PRN    lactated ringers infusion, , Intravenous, Continuous    heparin (Porcine) 5000 UNIT/ML injection 5,000 Units, 5,000 Units, Subcutaneous, Q8H ANNE    lidocaine-menthol 4-1 % patch 1 patch, 1 patch, Transdermal, Daily    pantoprazole (Protonix) 40 mg in sodium chloride 0.9% PF 10 mL IV push, 40 mg, Intravenous, QAM AC **OR** pantoprazole (Protonix) DR tab 40 mg, 40 mg, Oral, QAM AC      Physical Exam:                          General: alert, cooperative, in NAD                          HEENT: oropharynx clear without erythema or exudates, moist  mucous membranes                          Lungs: Clear to auscultation bilaterally, no wheezes or crackles                           Chest wall: No tenderness or deformity.                          Heart: Regular rate and rhythm, normal S1S2                          Abdomen: soft, non-tender, non-distended, positive BS.                          Extremity: No clubbing or cyanosis. no edema                          Skin: No rashes or lesions.       Lab Results   Component Value Date    WBC 10.9 02/12/2025    RBC 3.59 02/12/2025    HGB 10.2 02/12/2025    HCT 31.7 02/12/2025    MCV 88.3 02/12/2025    MCH 28.4 02/12/2025    MCHC 32.2 02/12/2025    RDW 13.8 02/12/2025    .0 02/12/2025     Lab Results   Component Value Date     02/13/2025    K 3.7 02/13/2025    K 3.7 02/13/2025     02/13/2025    CO2 28.0 02/13/2025    BUN 17 02/13/2025    CREATSERUM 0.68 02/13/2025     02/13/2025    CA 9.2 02/13/2025     Lab Results   Component Value Date    INR 1.04 02/11/2025    INR 1.00 02/10/2025    INR 1.04 10/04/2023          Imaging: I have independently visualized all relevant chest imaging in PACS.  I agree with the radiology interpretation except where noted.       ASSESSMENT/PLAN:  Acute on chronic hypoxemic respiratory failure: suspect atelectasis in the post operative period  -per last SST needs 1L at rest and 6L with activity, pt states that she has been using 6L all the time - currently on 5L at rest  -CXR without focal infiltrate.  Lung images on CT a/p with LLL consolidation with air bronchograms, slightly increased from 12/2024.  Currently monitoring off abx.   -encourage IS   -wean O2 as able  COPD/emphysema: no evidence of exacerbation  -OP PFTs with mild obstruction with severe decrease in diffusion capacity  -advair/incruse for home trelegy  -BD protocol  -previously worked up for endobronchial valves as OP, deemed not a candidate  Pulm HTN: RVSP: 39mmHg on echo in 2/2023  -maintain euvolemia    Enlarging abdominal aortic aneurysm  -s/p endovascular AAA stent graft repair with fenestrated device 2/10 with Dr. Monterroso  -BP management per vascular surgery   -pain control  Colitis  -surgery eval appreciated  Dispo:  -DNAR/select  -stable from pulm perspective for discharge  -pt to follow up with pulm APN in 2 weeks    Miley Jacques MD  2/13/2025  8:53 AM

## 2025-02-13 NOTE — PLAN OF CARE
A&Ox4. Patient on 5 liters NC. Continuous pulse ox maintained. Normal sinus rhythm on telemetry. Bilateral angiogram groin sites C/D/I, soft, no hematoma. Pt denies chest pain or shortness of breath. Patient continent of bowel and bladder. Chronic back pain managed with prn pain medication. Patient in bed with fall precautions in place. Discussed plan of care with patient. Patient verbalizes understanding.    Plan of care: Pain management, monitor overnight.     Problem: Patient/Family Goals  Goal: Patient/Family Long Term Goal  Description: Patient's Long Term Goal: Go home    Interventions:  - Monitor on tele  - o2  - Consults to see  - labs  - See additional Care Plan goals for specific interventions  Outcome: Progressing  Goal: Patient/Family Short Term Goal  Description: Patient's Short Term Goal: Regulate blood pressures    Interventions:   - follow medication regimen  - Attend follow up appointments  - Monitor on tele  - See additional Care Plan goals for specific interventions  Outcome: Progressing     Problem: CARDIOVASCULAR - ADULT  Goal: Maintains optimal cardiac output and hemodynamic stability  Description: INTERVENTIONS:  - Monitor vital signs, rhythm, and trends  - Monitor for bleeding, hypotension and signs of decreased cardiac output  - Evaluate effectiveness of vasoactive medications to optimize hemodynamic stability  - Monitor arterial and/or venous puncture sites for bleeding and/or hematoma  - Assess quality of pulses, skin color and temperature  - Assess for signs of decreased coronary artery perfusion - ex. Angina  - Evaluate fluid balance, assess for edema, trend weights  Outcome: Progressing  Goal: Absence of cardiac arrhythmias or at baseline  Description: INTERVENTIONS:  - Continuous cardiac monitoring, monitor vital signs, obtain 12 lead EKG if indicated  - Evaluate effectiveness of antiarrhythmic and heart rate control medications as ordered  - Initiate emergency measures for life  threatening arrhythmias  - Monitor electrolytes and administer replacement therapy as ordered  Outcome: Progressing

## 2025-02-13 NOTE — PLAN OF CARE
Received patient at 0730. Patient alert and oriented x4. Tele Rhythm NSR. O2 sats on 5L NC. Lungs diminished. Bed is locked and low position. Call light & personal belongings within reach. C/O back pain at this time/ Norco given. Patient voiding WNL. Patient tolerating ambulation well SBA. Bilateral groins CDI. Reviewed plan of care with patient and verbalized understanding.     POC:  DC planning    Problem: Patient/Family Goals  Goal: Patient/Family Long Term Goal  Description: Patient's Long Term Goal: Go home    Interventions:  - Monitor on tele  - o2  - Consults to see  - labs  - See additional Care Plan goals for specific interventions  Outcome: Progressing  Goal: Patient/Family Short Term Goal  Description: Patient's Short Term Goal: Regulate blood pressures    Interventions:   - follow medication regimen  - Attend follow up appointments  - Monitor on tele  - See additional Care Plan goals for specific interventions  Outcome: Progressing

## 2025-02-13 NOTE — PLAN OF CARE
Pt okay to discharge per primary and consults. Patient ambulating and tolerating well. Discharge instructions and education went over with patient & family and verbalized understanding. Patient went home, transport by wheelchair.

## 2025-02-13 NOTE — CM/SW NOTE
How Severe Is Your Skin Lesion?: moderate Received order for POLST. POLST order on chart for MD to complete prior to discharge. Order acknowledged.     Miranda FRANCISCO, LSW  Discharge Planner       Has Your Skin Lesion Been Treated?: not been treated Is This A New Presentation, Or A Follow-Up?: Skin Lesions

## 2025-02-13 NOTE — PROGRESS NOTES
[facility]   part of [facility]     Vascular Surgery Progress Note    Finn Carl Patient Status:  Inpatient    10/14/1940 MRN [id number]   Location [facility] 8NE-A Attending Jethro Monterroso MD   Hosp Day # 3 PCP Gisele Faulkner MD     Objective:   Temp: 98 °F (36.7 °C)  Pulse: 89  Resp: 16  BP: 146/74      Events Yesterday/Overnight:  Patient underwent a fenestrated endovascular aortic repair with right renal artery angioplasty and left external iliac artery angioplasty and stenting on 2/10 with Dr. Monterroso. Patient is doing well. No issues overnight. She rates her pain at a 3, mostly in her back. No nausea. BP is stable at 146/74. Eating and drinking well. Able to void. She has not had a BM.    Exam:  Palpable bilateral DP and PT pulses. Abdomen soft. Bilateral groin access sites are soft, no hematoma present. No drainage present. Neurologically intact. On oxygen therapy.    Impression/Plan:    Patient to discharge home today.    Will need to start Plavix 75 mg daily for 90 days.    Patient to shower daily, soap and water over the access sites.    Patient can take a stool softener. Drink plenty of water. Eat vegetables.    Follow up in the vascular clinic in 2 weeks.      Medications:  Current Facility-Administered Medications   Medication Dose Route Frequency    albuterol (Ventolin HFA) 108 (90 Base) MCG/ACT inhaler 2 puff  2 puff Inhalation QID PRN    FLUoxetine (PROzac) cap 40 mg  40 mg Oral Daily    umeclidinium bromide (Incruse Ellipta) 62.5 MCG/ACT inhaler 1 puff  1 puff Inhalation Daily    fluticasone-salmeterol (Advair Diskus) 250-50 MCG/ACT inhaler 1 puff  1 puff Inhalation BID    acetaminophen (Tylenol) tab 650 mg  650 mg Oral Q4H PRN    Or    HYDROcodone-acetaminophen (Norco) 5-325 MG per tab 1 tablet  1 tablet Oral Q4H PRN    Or    HYDROcodone-acetaminophen (Norco) 5-325 MG per tab 2 tablet  2 tablet Oral Q4H PRN    ondansetron (Zofran) 4 MG/2ML injection 4 mg  4 mg Intravenous Q6H  PRN    metoclopramide (Reglan) 5 mg/mL injection 5 mg  5 mg Intravenous Q8H PRN    lactated ringers infusion   Intravenous Continuous    heparin (Porcine) 5000 UNIT/ML injection 5,000 Units  5,000 Units Subcutaneous Q8H ANNE    lidocaine-menthol 4-1 % patch 1 patch  1 patch Transdermal Daily    pantoprazole (Protonix) 40 mg in sodium chloride 0.9% PF 10 mL IV push  40 mg Intravenous QAM AC    Or    pantoprazole (Protonix) DR tab 40 mg  40 mg Oral QAM AC       Laboratory/Data:    LABS:  Recent Labs   Lab 02/10/25  0655 02/10/25  1348 02/11/25  0408 02/12/25  0542 02/12/25  1658   WBC  --  12.1* 13.6* 12.6* 10.9   HGB  --  10.9* 10.6* 10.3* 10.2*   MCV  --  87.2 85.0 86.1 88.3   PLT  --  157.0 174.0 158.0 142.0*   INR 1.00  --  1.04  --   --        Recent Labs   Lab 02/10/25  1348 02/11/25  0408 02/12/25  0542 02/13/25  0523    140 140 140   K 5.6* 3.7 3.2*  3.2* 3.7  3.7    104 103 104   CO2 25.0 26.0 28.0 28.0   BUN 17 15 15 17   CREATSERUM 0.76 0.70 0.75 0.68   * 153* 135* 107*   CA 8.6* 9.5 9.3 9.2   MG  --   --  1.8 1.8     Recent Labs   Lab 02/10/25  0655 02/11/25  0408 02/12/25  0943   PTP 13.3 13.8  --    INR 1.00 1.04  --    PTT 33.2 29.3 70.0*     Recent Labs   Lab 02/10/25  1348   ALT 11   AST 34*   ALB 4.2     No results for input(s): \"TROP\" in the last 168 hours.  No results found for: \"ANAS\", \"DANNA\", \"ANASCRN\"  No results for input(s): \"PCACT\", \"PSACT\", \"AT3ACT\", \"HIPAB\", \"PATHI\", \"STALA\", \"DRVVTRATIO\", \"DRVVT\", \"STACLOT\", \"CARIGG\", \"Q4NA8KPTIG\", \"Q0GG3EXPVP\", \"RA\", \"HAVIGM\", \"HBCIGM\", \"HCVAB\", \"HBSAG\", \"HBCAB\", \"HBVDNAINTERP\", \"ANAS\", \"C3\", \"C4\" in the last 168 hours.    RANJIT Bradley  2/13/2025  8:30 AM

## 2025-02-13 NOTE — DISCHARGE SUMMARY
[facility]  Discharge Summary    Finn Carl Patient Status:  Inpatient    10/14/1940 MRN [id number]   Location [facility] 8NE-A Attending No att. 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    Discharge Diagnosis:   Patient Active Problem List   Diagnosis    Postlaminectomy syndrome, lumbar region    Long-term current use of opiate analgesic    Disc displacement, lumbar    Left lumbar radiculitis    Cervical radiculitis    S/P cervical spinal fusion    Spondylosis of cervical region without myelopathy or radiculopathy    DDD (degenerative disc disease), cervical    Cervical spinal stenosis    SOB (shortness of breath)    Abdominal aortic aneurysm (AAA) without rupture    Pulmonary hypertension (HCC)    Chronic obstructive pulmonary disease, unspecified COPD type (HCC)    Localized edema    Chronic diastolic (congestive) heart failure (HCC)    Medicare annual wellness visit, subsequent    Post-menopausal    Dysthymia    Impaired fasting glucose    Sinus headache    Acute maxillary sinusitis, recurrence not specified    Hypoxia    Elevated blood pressure reading without diagnosis of hypertension    Venous stasis ulcer of other part of right lower leg limited to breakdown of skin with varicose veins (HCC)    Right hip pain    Dyspnea, unspecified type    Pneumonia of both lower lobes due to infectious organism    Acute pain of right shoulder    Pneumonia due to infectious organism    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 She underwent 3v fenestrated aortic repair. Postop course unremarkable except for some generalize discomfort. It was however  uncomplicated. On POD#3, she was tolerating PO, ambulating, and voiding without issue with O2 at baseline. She was discharged home.    Consultations: Hospitalist    Procedures: FEVAR    Complications: None    Disposition: Home or Self Care    Discharge Condition: Good    Discharge Medications:   Discharge Medication List as of 2/13/2025 10:46 AM        START taking these medications    Details   clopidogrel 75 MG Oral Tab Take 1 tablet (75 mg total) by mouth daily., Normal, Disp-90 tablet, R-0           CONTINUE these medications which have NOT CHANGED    Details   HYDROcodone-acetaminophen  MG Oral Tab Take 1 tablet by mouth every 8 (eight) hours as needed for Pain., Historical      HYDROcodone-acetaminophen 5-325 MG Oral Tab Take 1 tablet by mouth every 6 (six) hours as needed for Pain., Historical      Cholecalciferol (VITAMIN D) 25 MCG (1000 UT) Oral Tab Take 1 tablet by mouth daily., Historical      INCRUSE ELLIPTA 62.5 MCG/INH Inhalation Aerosol Powder, Breath Activated Inhale 1 puff into the lungs daily., Normal, Disp-30 each, R-1, BOB      FLUOXETINE HCL 40 MG Oral Cap TAKE 1 CAPSULE(40 MG) BY MOUTH DAILY, Normal, Disp-90 capsule, R-0      PROAIR  (90 Base) MCG/ACT Inhalation Aero Soln Inhale 2 puffs into the lungs 4 (four) times daily as needed., Historical, Disp-1 Inhaler, R-12, BOB      Multiple Vitamins-Minerals (MULTIVITAL) Oral Chew Tab Chew 1 tablet by mouth daily., Historical      SYMBICORT 160-4.5 MCG/ACT Inhalation Aerosol INL 2 PFS PO BID. RM AFTER EACH U, Normal, Disp-1 Inhaler, R-5, BOB      Calcium Carbonate (CALCIUM 500 OR) Take 2 tablets by mouth daily., Historical             Follow up Visits: Follow-up with Dr Monterroso in 2 weeks.      Jethro Monterroso MD  2/13/2025  1:27 PM

## 2025-02-13 NOTE — PROGRESS NOTES
[facility] Hospitalist Progress Note                                                                     [facility]   part of [facility]      Finn Carl  10/14/1940    SUBJECTIVE: no chest pain, palpitations, shortness of breath, cough. Pt abdominal pain, nausea and vomiting resolved. Feeling better. Hoping to go home today     OBJECTIVE:  Temp:  [97.4 °F (36.3 °C)-98.9 °F (37.2 °C)] 98 °F (36.7 °C)  Pulse:  [74-93] 89  Resp:  [16-25] 16  BP: (111-151)/(55-74) 146/74  SpO2:  [94 %-99 %] 96 %  Exam  Gen: No acute distress, alert and oriented  Pulm: Lungs clear bilaterally, normal respiratory effort, no crackles, no wheezing  CV: Heart with regular rate and rhythm, no murmur.   Abd: Abdomen soft, no tenderness, nondistended, active BS  MSK:no significant pitting edema or tenderness of the LE  Skin: no new rashes or lesions    Labs:   Recent Labs   Lab 02/10/25  0655 02/10/25  1348 02/11/25  0408 02/12/25  0542 02/12/25  1658   WBC  --  12.1* 13.6* 12.6* 10.9   HGB  --  10.9* 10.6* 10.3* 10.2*   MCV  --  87.2 85.0 86.1 88.3   PLT  --  157.0 174.0 158.0 142.0*   INR 1.00  --  1.04  --   --        Recent Labs   Lab 02/10/25  1348 02/11/25  0408 02/12/25  0542 02/13/25  0523    140 140 140   K 5.6* 3.7 3.2*  3.2* 3.7  3.7    104 103 104   CO2 25.0 26.0 28.0 28.0   BUN 17 15 15 17   CREATSERUM 0.76 0.70 0.75 0.68   CA 8.6* 9.5 9.3 9.2   MG  --   --  1.8 1.8   * 153* 135* 107*       Recent Labs   Lab 02/10/25  1348   ALT 11   AST 34*   ALB 4.2       No results for input(s): \"PGLU\" in the last 168 hours.    Meds:   Scheduled:    magnesium oxide  400 mg Oral Once    potassium chloride  40 mEq Oral Once    FLUoxetine HCl  40 mg Oral Daily    umeclidinium bromide  1 puff Inhalation Daily    fluticasone-salmeterol  1 puff Inhalation BID    heparin  5,000 Units Subcutaneous Q8H ANNE    lidocaine-menthol  1 patch Transdermal Daily    pantoprazole  40 mg  Intravenous QAM AC    Or    pantoprazole  40 mg Oral QAM AC     Continuous Infusions:    lactated ringers Stopped (02/11/25 1140)     PRN:   albuterol    acetaminophen **OR** HYDROcodone-acetaminophen **OR** HYDROcodone-acetaminophen    ondansetron    metoclopramide    ASSESSMENT / PLAN:   84 year old female with history of anxiety, copd, cad, hld, pulmonary htn, osteoporosis presenting with 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   -vascular following  -cardene for BP control --> off     Post Operative Pain Control  -toradol iv atc  -Lidocaine patch  -acetaminophen po prn  -norco po prn  -dc pain meds per surgical service    Abdominal Pain  Nausea with vomiting  -ct abd ordered--> colitis--> surgery consulted--> iv abx, clears--> follow rec--> IV abx stopped 2/12, no fever, clinically still doing well, no abx on dc if dc today     HTN  -sbp stable  -nicardipine drip as needed     Anxiety  -fluoxetine     COPD   -advair  -incruse  -pulmonary following    CAD hx  Chronic chest pain  -normal stress test in 2023  -no medications at this time  -cardiology following     Quality:  DVT Prophylaxis: scd, heparin sq  CODE status: DNR select per chart  [place]:      Plan of care discussed with patient and staff     Dispo: medically stable for discharge     Ramses Serna MD  [facility]  [phone]    Addendum: no fevers, no leukocytosis, abdominal pain  better. IV abx stopped, monitor pt off abx.

## 2025-02-28 ENCOUNTER — APPOINTMENT (OUTPATIENT)
Dept: CT IMAGING | Facility: HOSPITAL | Age: 85
End: 2025-02-28
Attending: EMERGENCY MEDICINE
Payer: MEDICARE

## 2025-02-28 ENCOUNTER — HOSPITAL ENCOUNTER (EMERGENCY)
Facility: HOSPITAL | Age: 85
Discharge: HOME | End: 2025-02-28
Attending: EMERGENCY MEDICINE
Payer: MEDICARE

## 2025-02-28 VITALS
WEIGHT: 117 LBS | HEIGHT: 64 IN | TEMPERATURE: 98 F | RESPIRATION RATE: 15 BRPM | HEART RATE: 75 BPM | DIASTOLIC BLOOD PRESSURE: 82 MMHG | BODY MASS INDEX: 19.97 KG/M2 | OXYGEN SATURATION: 100 % | SYSTOLIC BLOOD PRESSURE: 129 MMHG

## 2025-02-28 DIAGNOSIS — J44.1 COPD EXACERBATION (HCC): ICD-10-CM

## 2025-02-28 DIAGNOSIS — M54.50 BACK PAIN OF THORACOLUMBAR REGION: Primary | ICD-10-CM

## 2025-02-28 DIAGNOSIS — M54.6 BACK PAIN OF THORACOLUMBAR REGION: Primary | ICD-10-CM

## 2025-02-28 DIAGNOSIS — J98.11 ATELECTASIS: ICD-10-CM

## 2025-02-28 LAB
ALBUMIN SERPL-MCNC: 4.5 G/DL (ref 3.2–4.8)
ALBUMIN/GLOB SERPL: 1.7 {RATIO} (ref 1–2)
ALP LIVER SERPL-CCNC: 112 U/L
ALT SERPL-CCNC: 10 U/L
ANION GAP SERPL CALC-SCNC: 8 MMOL/L (ref 0–18)
AST SERPL-CCNC: 14 U/L (ref ?–34)
ATRIAL RATE: 77 BPM
BASOPHILS # BLD AUTO: 0.06 X10(3) UL (ref 0–0.2)
BASOPHILS NFR BLD AUTO: 0.7 %
BILIRUB SERPL-MCNC: 0.2 MG/DL (ref 0.2–1.1)
BUN BLD-MCNC: 17 MG/DL (ref 9–23)
CALCIUM BLD-MCNC: 9.6 MG/DL (ref 8.7–10.6)
CHLORIDE SERPL-SCNC: 104 MMOL/L (ref 98–112)
CO2 SERPL-SCNC: 29 MMOL/L (ref 21–32)
CREAT BLD-MCNC: 0.89 MG/DL
EGFRCR SERPLBLD CKD-EPI 2021: 64 ML/MIN/1.73M2 (ref 60–?)
EOSINOPHIL # BLD AUTO: 0.17 X10(3) UL (ref 0–0.7)
EOSINOPHIL NFR BLD AUTO: 2.1 %
ERYTHROCYTE [DISTWIDTH] IN BLOOD BY AUTOMATED COUNT: 13.2 %
GLOBULIN PLAS-MCNC: 2.6 G/DL (ref 2–3.5)
GLUCOSE BLD-MCNC: 98 MG/DL (ref 70–99)
HCT VFR BLD AUTO: 32.4 %
HGB BLD-MCNC: 10 G/DL
IMM GRANULOCYTES # BLD AUTO: 0.04 X10(3) UL (ref 0–1)
IMM GRANULOCYTES NFR BLD: 0.5 %
LIPASE SERPL-CCNC: 24 U/L (ref 12–53)
LYMPHOCYTES # BLD AUTO: 1.23 X10(3) UL (ref 1–4)
LYMPHOCYTES NFR BLD AUTO: 15.1 %
MCH RBC QN AUTO: 27 PG (ref 26–34)
MCHC RBC AUTO-ENTMCNC: 30.9 G/DL (ref 31–37)
MCV RBC AUTO: 87.6 FL
MONOCYTES # BLD AUTO: 0.64 X10(3) UL (ref 0.1–1)
MONOCYTES NFR BLD AUTO: 7.9 %
NEUTROPHILS # BLD AUTO: 5.98 X10 (3) UL (ref 1.5–7.7)
NEUTROPHILS # BLD AUTO: 5.98 X10(3) UL (ref 1.5–7.7)
NEUTROPHILS NFR BLD AUTO: 73.7 %
OSMOLALITY SERPL CALC.SUM OF ELEC: 294 MOSM/KG (ref 275–295)
P AXIS: 30 DEGREES
P-R INTERVAL: 136 MS
PLATELET # BLD AUTO: 324 10(3)UL (ref 150–450)
POTASSIUM SERPL-SCNC: 4.6 MMOL/L (ref 3.5–5.1)
PROT SERPL-MCNC: 7.1 G/DL (ref 5.7–8.2)
Q-T INTERVAL: 376 MS
QRS DURATION: 72 MS
QTC CALCULATION (BEZET): 425 MS
R AXIS: 47 DEGREES
RBC # BLD AUTO: 3.7 X10(6)UL
SODIUM SERPL-SCNC: 141 MMOL/L (ref 136–145)
T AXIS: 32 DEGREES
VENTRICULAR RATE: 77 BPM
WBC # BLD AUTO: 8.1 X10(3) UL (ref 4–11)

## 2025-02-28 PROCEDURE — 96375 TX/PRO/DX INJ NEW DRUG ADDON: CPT

## 2025-02-28 PROCEDURE — 80053 COMPREHEN METABOLIC PANEL: CPT | Performed by: EMERGENCY MEDICINE

## 2025-02-28 PROCEDURE — 83690 ASSAY OF LIPASE: CPT | Performed by: EMERGENCY MEDICINE

## 2025-02-28 PROCEDURE — 99285 EMERGENCY DEPT VISIT HI MDM: CPT

## 2025-02-28 PROCEDURE — 93005 ELECTROCARDIOGRAM TRACING: CPT

## 2025-02-28 PROCEDURE — 96374 THER/PROPH/DIAG INJ IV PUSH: CPT

## 2025-02-28 PROCEDURE — 93010 ELECTROCARDIOGRAM REPORT: CPT

## 2025-02-28 PROCEDURE — 74174 CTA ABD&PLVS W/CONTRAST: CPT | Performed by: EMERGENCY MEDICINE

## 2025-02-28 PROCEDURE — 85025 COMPLETE CBC W/AUTO DIFF WBC: CPT | Performed by: EMERGENCY MEDICINE

## 2025-02-28 RX ORDER — ONDANSETRON 2 MG/ML
4 INJECTION INTRAMUSCULAR; INTRAVENOUS ONCE
Status: COMPLETED | OUTPATIENT
Start: 2025-02-28 | End: 2025-02-28

## 2025-02-28 RX ORDER — CYCLOBENZAPRINE HCL 10 MG
10 TABLET ORAL NIGHTLY
COMMUNITY
Start: 2025-02-24 | End: 2025-03-26

## 2025-02-28 RX ORDER — HYDROMORPHONE HYDROCHLORIDE 1 MG/ML
0.5 INJECTION, SOLUTION INTRAMUSCULAR; INTRAVENOUS; SUBCUTANEOUS EVERY 30 MIN PRN
Status: DISCONTINUED | OUTPATIENT
Start: 2025-02-28 | End: 2025-02-28

## 2025-02-28 NOTE — ED INITIAL ASSESSMENT (HPI)
Pt presents to the ED with c/o lower back pain radiating to abdomen for 2 weeks after AAA repair. Pt states pain is worse with movement. Pt taking norco for pain. Pt states she had a CT scheduled today but came to the ER instead. Pt awake and alert, skin w/d,resps appear unlabored. Pt arrived on 6l/nc from home.

## 2025-02-28 NOTE — ED PROVIDER NOTES
Patient Seen in: [facility] Emergency Department      History     Chief Complaint   Patient presents with    Pain     Stated Complaint: AAA stent placed 2 weeks ago per pt,  pt now having back pain, losing weight 12*    Subjective:   HPI    84-year-old female who presents to the emergency department complaining of having back pain that that radiates to her abdomen for the past 2 weeks.  Patient had a endovascular repair of her AAA that was nonruptured.  Reviewing her records procedure was performed on 2/10/2025.  Patient said afterwards she had episodes of vomiting and was told that she had some colitis. The patient has continued to have pain in her back and initially thought it was related to her laying on a operating room table.  Since the pain has persisted for 2 weeks she became more concerned and came to the ER for evaluation.  She denies any fevers at this time.      Objective:     No pertinent past medical history.            No pertinent past surgical history.              Social History     Socioeconomic History    Marital status:    Tobacco Use    Smoking status: Former     Current packs/day: 0.00     Types: Cigarettes     Quit date: 3/1/2005     Years since quittin.0    Smokeless tobacco: Never   Vaping Use    Vaping status: Never Used   Substance and Sexual Activity    Alcohol use: No    Drug use: No     Comment: pt did not respond     Social Drivers of Health     Food Insecurity: No Food Insecurity (2/10/2025)    NCSS - Food Insecurity     Worried About Running Out of Food in the Last Year: No     Ran Out of Food in the Last Year: No   Transportation Needs: No Transportation Needs (2/10/2025)    NCSS - Transportation     Lack of Transportation: No   Housing Stability: Not At Risk (2/10/2025)    NCSS - Housing/Utilities     Has Housing: Yes     Worried About Losing Housing: No     Unable to Get Utilities: No                  Physical Exam     ED Triage Vitals [25 1129]   BP  138/75   Pulse 81   Resp 16   Temp 98.4 °F (36.9 °C)   Temp src Temporal   SpO2 100 %   O2 Device Nasal cannula       Current Vitals:   Vital Signs  BP: (!) 168/74  Pulse: 93  Resp: 20  Temp: 98.4 °F (36.9 °C)  Temp src: Temporal  MAP (mmHg): 97    Oxygen Therapy  SpO2: (!) 88 %  O2 Device: Nasal cannula  O2 Flow Rate (L/min): 6 L/min        Physical Exam  General: This a pleasant nontoxic appearing patient in no apparent distress alert and oriented ×3  HEENT: Pupils are equal reactive to light.  Extra ocular motions are intact.  No scleral icterus or conjunctival pallor. Head is atraumatic normocephalic.  Lungs: Clear to auscultation bilaterally.  No wheezes, rhonchi, or rales appreciated.  No accessory muscle use noted for breathing.  Cardiac: Regular rate and rhythm.  Normal S1 and 2 without murmurs or ectopy appreciated  Abdomen: Soft on examination without tenderness to deep palpation or to percussion.  No masses appreciated.  Bowel sounds are normoactive.  No CVA tenderness.  Extremities: No cyanosis, no edema or clubbing.  Pulses are +2.  Full range of motion is noted of the extremities without deformities.  No tenderness.  Neurologically intact.    ED Course     Labs Reviewed   CBC WITH DIFFERENTIAL WITH PLATELET - Abnormal; Notable for the following components:       Result Value    RBC 3.70 (*)     HGB 10.0 (*)     HCT 32.4 (*)     MCHC 30.9 (*)     All other components within normal limits   COMP METABOLIC PANEL (14) - Normal   LIPASE - Normal   RAINBOW DRAW LAVENDER   RAINBOW DRAW LIGHT GREEN   RAINBOW DRAW BLUE     EKG    Rate, intervals and axes as noted on EKG Report.  Rate: 77  Rhythm: Sinus Rhythm  Reading: Normal sinus rhythm normal EKG         Narrative  PROCEDURE:  CTA ABD/PEL (CPT=74174)     COMPARISON:  IONA , CT, CTA ABD/PEL (CPT=74174), 2/11/2025, 10:24 AM.     INDICATIONS:  AAA stent placed 2 weeks ago per pt,  pt now having back pain, losing weight 12+ lbs since surgery.  History of  colitis.  Assess for integrity of her stent.    TECHNIQUE:  CT images of the abdomen and pelvis were obtained pre- and post- injection of non-ionic intravenous contrast material. Multi-planar reformatted/3-D images were created to optimize visualization of vascular anatomy.  Dose reduction techniques  were used. Dose information is transmitted to the ACR (American College of Radiology) NRDR (National Radiology Data Registry) which includes the Dose Index Registry.     PATIENT STATED HISTORY:(As transcribed by Technologist)  Abdominal aortic aneurysm, stent placed two weeks ago, continuing abdominal pain, weight loss      CONTRAST USED:  100cc of Isovue 370     FINDINGS:    AORTA/VASCULAR:  There is a patent aortic and bilateral iliac stenting.  Associated stents of the SMA and bad lateral renal arteries are also patent.  The surrounding aneurysm sac is relatively stable in size, with the previously noted small degree of  intraluminal air within the aneurysm sac having resolved.  The sac currently measures 5.2 x 5.3 cm in AP and transverse dimensions.  Based on my personal measurements of the previous study, the sac previously measured 5.0 x 5.3 cm.  There is no discrete  evidence of an endoleak.  LIVER:  No enlargement, atrophy, abnormal density, or significant focal lesion.    BILIARY:  No visible dilatation or calcification.    PANCREAS:  No lesion, fluid collection, ductal dilatation, or atrophy.    SPLEEN:  No enlargement or focal lesion.    KIDNEYS:  No mass, obstruction, or calcification.    ADRENALS:  No mass or enlargement.    RETROPERITONEUM:  Normal.  No mass or adenopathy.    BOWEL/MESENTERY:  No visible mass, obstruction, or bowel wall thickening.  There are uncomplicated colonic diverticular changes most pronounced along the sigmoid colon.  No free intraperitoneal air, fluid or inflammatory changes of the peritoneal cavity.  ABDOMINAL WALL:  No mass or hernia.    URINARY BLADDER:  No visible focal wall  thickening, lesion, or calculus.    PELVIC NODES:  No adenopathy.    PELVIC ORGANS:  No visible mass.  Pelvic organs appropriate for patient age.    BONES:  No bony lesion or fracture.    LUNG BASES:  Stable moderate centrilobular emphysema.  Stable linear scarring in the right lung base.  Previously noted consolidative changes in the left lung base have decreased.                  Impression  CONCLUSION:    1. Stable stent grafting of the aorta and bilateral common iliac arteries.  Stable size of the aneurysm sac without evidence of endoleak.  2. Decrease in consolidative opacities within the left lung base.  3. There is no discrete evidence of an acute intra-abdominal or pelvic process.  4. Diverticular changes of the colon appears stable.  No acute diverticulitis or colitis.        LOCATION:  [facility]        Dictated by (CST): Eva Rehman DO on 2/28/2025 at 3:54 PM      Finalized by (CST): Eva Rehman DO on 2/28/2025 at 4:04 PM                MDM    Differential diagnosis includes but is not limited to stent occlusion, retroperitoneal bleed, ruptured AAA, colonic rupture, colitis, musculoskeletal back pain.  The patient has a history of having postlaminectomy syndrome that can also contribute to the patient symptomology.  We will need to rule out other possible etiologies at this time considering she is postoperative from AAA repair.  The patient was given Dilaudid for pain control.  Zofran for nausea control.  Laboratory sent.  Hemoglobin of 10 hematocrit of 32 metabolic panel was normal.  CTA abdomen and pelvis was performed.  I reviewed the x-rays and agree with the radiologist report that showed stable stent grafting of the aorta and bilateral common iliac arteries.  Stable size of the aneurysm sac without evidence of endoleak.  Decrease in consolidative opacities within the left lung base.  There is no discrete evidence of an acute intra-abdominal or pelvic process.  Diverticular changes of the colon  appear stable.  No acute diverticulitis or colitis.  The patient may have a combination of entities causing her pain at this time.  The atelectasis in her lung may be causing some of the symptomology as well as some of her chronic low back pain may be exacerbated from her recent surgery.  Patient is on Norco for pain control at home.  She has been taking it for an extensive period of time and she may have some tolerance to the medication that may be leading to some breakthrough discomfort.  Encouraged the patient to continue with the use of her incentive spirometer for her atelectasis in the lung base.  Return if symptoms progress or worsen.  She is comfortable being discharged at this time.  Answered all of her and her family's questions.  Continue with her 6 L of O2 at home.  Use her inhalers as needed.  Note to Patient  The 21st Century Cures Act makes medical notes like these available to patients in the interest of transparency. However, be advised this is a medical document and is intended as [id number] communication; it is written in medical language and may appear blunt, direct, or contain abbreviations or verbiage that are unfamiliar. Medical documents are intended to carry relevant information, facts as evident, and the clinical opinion of the practitioner.  Patient was evaluated and a screening exam was performed.   As a treating physician attending to the patient, I determined, within reasonable clinical confidence and prior to discharge, that an emergency medical condition was not or was no longer present.  There was no indication for further evaluation, treatment or admission on an emergency basis.  Comprehensive verbal and written discharge and follow-up instructions were provided to help prevent relapse or worsening.  Patient was instructed to follow-up with their primary care provider for further evaluation and treatment, but to return immediately to the ER for worsening, concerning, new, changing  or persisting symptoms.  I discussed the case with the patient and they had no questions, complaints, or concerns.  Patient felt comfortable going home.  ^^Please note that this report has been produced using speech recognition software and may contain errors related to that system including, but not limited to, errors in grammar, punctuation, and spelling, as well as words and phrases that possibly may have been recognized inappropriately.  If there are any questions or concerns, contact the dictating provider for clarification.    Medical Decision Making      Disposition and Plan     Clinical Impression:  1. Back pain of thoracolumbar region    2. Atelectasis    3. COPD exacerbation (HCC)         Disposition:  Discharge  2/28/2025  4:28 pm    Follow-up:  Gisele Faulkner MD  [address] [place] [address] [phone]    Schedule an appointment as soon as possible for a visit in 2 day(s)            Medications Prescribed:  Current Discharge Medication List              Supplementary Documentation:

## 2025-02-28 NOTE — ED QUICK NOTES
Rounding Completed    Plan of Care reviewed. Waiting for CT.    Bed is locked and in lowest position. Call light within reach.

## (undated) DEVICE — COVER,TABLE,44X90,STERILE: Brand: MEDLINE

## (undated) DEVICE — 3M™ IOBAN™ 2 ANTIMICROBIAL INCISE DRAPE 6651EZ: Brand: IOBAN™ 2

## (undated) DEVICE — FLEXOR, CHECK-FLO, INTRODUCER ANSEL 1 MODIFICATION - WITH HIGH-FLEX DILATOR AND HYDROPHILIC COATING: Brand: FLEXOR

## (undated) DEVICE — 3M™ TEGADERM™ TRANSPARENT FILM DRESSING FRAME STYLE, 1626W, 4 IN X 4-3/4 IN (10 CM X 12 CM), 50/CT 4CT/CASE: Brand: 3M™ TEGADERM™

## (undated) DEVICE — FLEXOR, CHECK-FLO, INTRODUCER ANSEL MODIFICATION - WITH HIGH-FLEX DILATOR AND HYDROPHILIC COATING: Brand: FLEXOR

## (undated) DEVICE — PINNACLE INTRODUCER SHEATH: Brand: PINNACLE

## (undated) DEVICE — PTA BALLOON DILATATION CATHETER: Brand: MUSTANG™

## (undated) DEVICE — ADHESIVE SKIN TOP FOR WND CLSR DERMBND ADV

## (undated) DEVICE — WIRE ROSEN J 0.035INX260CM

## (undated) DEVICE — Device

## (undated) DEVICE — TRAY CATH 16FR F INCL BARDX IC COMPLT CARE

## (undated) DEVICE — PACK ANGIOGRAPHY CUSTOM

## (undated) DEVICE — SOLUTION IRRIG 1000ML 0.9% NACL USP BTL

## (undated) DEVICE — PACK CV CUSTOM

## (undated) DEVICE — STERILE POLYISOPRENE POWDER-FREE SURGICAL GLOVES: Brand: PROTEXIS

## (undated) DEVICE — SLEEVE COMPR MD KNEE LEN SGL USE KENDALL SCD

## (undated) DEVICE — BEACON TIP TORCON NB ADVANTAGE CATHETER: Brand: BEACON TIP TORCON NB

## (undated) DEVICE — WIRE LUNDERQUIST DC 300 2CRV

## (undated) DEVICE — REM POLYHESIVE ADULT PATIENT RETURN ELECTRODE: Brand: VALLEYLAB

## (undated) DEVICE — KIT INFL DEV 20ML W/ INSRT TOOL 3 W STPCOCK

## (undated) DEVICE — SUT CHRM GUT 3-0 27IN SH ABSRB UD 26MM 1/2

## (undated) DEVICE — ABSORBABLE HEMOSTAT (OXIDIZED REGENERATED CELLULOSE): Brand: SURGICEL NU-KNIT

## (undated) DEVICE — PERCLOSE™ PROSTYLE™ SUTURE-MEDIATED CLOSURE AND REPAIR SYSTEM: Brand: PERCLOSE™ PROSTYLE™

## (undated) DEVICE — Device: Brand: VISIONS PV .035 DIGITAL IVUS CATHETER

## (undated) DEVICE — KIT,RULER,6 IN,DISPOSABLE,STR: Brand: MEDLINE

## (undated) DEVICE — RADIFOCUS GLIDEWIRE ADVANTAGE GUIDEWIRE: Brand: GLIDEWIRE ADVANTAGE

## (undated) DEVICE — SUT ETHBND XL 4-0 30IN RB-1 NABSRB GRN 17MM 1

## (undated) DEVICE — Device: Brand: QUICK-CROSS SUPPORT CATHETER

## (undated) DEVICE — RADIFOCUS GLIDEWIRE: Brand: GLIDEWIRE

## (undated) DEVICE — GAMMEX® NON-LATEX SIZE 7.5, STERILE NEOPRENE POWDER-FREE SURGICAL GLOVE: Brand: GAMMEX

## (undated) DEVICE — STEERABLE GUIDEWIRE: Brand: BACK-UP MEIER

## (undated) DEVICE — FIXED CORE WIRE GUIDE SAFE-T-J, CURVED: Brand: COOK

## (undated) DEVICE — PROXIMATE RH ROTATING HEAD SKIN STAPLERS (35 WIDE) CONTAINS 35 STAINLESS STEEL STAPLES: Brand: PROXIMATE

## (undated) DEVICE — 3M™ BAIR HUGGER® UNDERBODY BLANKET, FULL ACCESS, 10 PER CASE 63500: Brand: BAIR HUGGER™

## (undated) DEVICE — GOWN,SIRUS,FABRIC-REINFORCED,X-LARGE: Brand: MEDLINE

## (undated) DEVICE — CATH TEMPO 5F PIG 65CM 5SH: Brand: TEMPO

## (undated) DEVICE — ADVANCE, 35LP LOW PROFILE PTA BALLOON DILATATION CATHETER: Brand: ADVANCE

## (undated) DEVICE — CODA, LP BALLOON CATHETER: Brand: CODA

## (undated) DEVICE — HIGH TEMPERATURE CAUTERY: Brand: ACCU-TEMP®